# Patient Record
Sex: MALE | Race: WHITE | Employment: FULL TIME | URBAN - METROPOLITAN AREA
[De-identification: names, ages, dates, MRNs, and addresses within clinical notes are randomized per-mention and may not be internally consistent; named-entity substitution may affect disease eponyms.]

---

## 2018-06-22 ENCOUNTER — HOSPITAL ENCOUNTER (OUTPATIENT)
Age: 38
Discharge: HOME | End: 2018-06-22
Payer: COMMERCIAL

## 2018-06-22 LAB
ALBUMIN SERPL-MCNC: 4.3 G/DL (ref 3.5–5.2)
ALBUMIN/GLOBULIN RATIO: ABNORMAL (ref 1–2.5)
ALP BLD-CCNC: 67 U/L (ref 40–129)
ALT SERPL-CCNC: 100 U/L (ref 5–41)
ANION GAP SERPL CALCULATED.3IONS-SCNC: 12 MMOL/L (ref 9–17)
AST SERPL-CCNC: 68 U/L
BILIRUB SERPL-MCNC: 0.39 MG/DL (ref 0.3–1.2)
BUN BLDV-MCNC: 11 MG/DL (ref 6–20)
BUN/CREAT BLD: 13 (ref 9–20)
C-REACTIVE PROTEIN: 5.4 MG/L (ref 0–5)
CALCIUM SERPL-MCNC: 8.6 MG/DL (ref 8.6–10.4)
CHLORIDE BLD-SCNC: 102 MMOL/L (ref 98–107)
CHOLESTEROL, FASTING: 252 MG/DL
CHOLESTEROL/HDL RATIO: 6.6
CO2: 25 MMOL/L (ref 20–31)
CREAT SERPL-MCNC: 0.83 MG/DL (ref 0.7–1.2)
GFR AFRICAN AMERICAN: >60 ML/MIN
GFR NON-AFRICAN AMERICAN: >60 ML/MIN
GFR SERPL CREATININE-BSD FRML MDRD: ABNORMAL ML/MIN/{1.73_M2}
GFR SERPL CREATININE-BSD FRML MDRD: ABNORMAL ML/MIN/{1.73_M2}
GLUCOSE FASTING: 112 MG/DL (ref 70–99)
HCT VFR BLD CALC: 44.8 % (ref 41–53)
HDLC SERPL-MCNC: 38 MG/DL
HEMOGLOBIN: 15.1 G/DL (ref 13.5–17.5)
LDL CHOLESTEROL: 188 MG/DL (ref 0–130)
MCH RBC QN AUTO: 32.3 PG (ref 26–34)
MCHC RBC AUTO-ENTMCNC: 33.6 G/DL (ref 31–37)
MCV RBC AUTO: 96.2 FL (ref 80–100)
NRBC AUTOMATED: NORMAL PER 100 WBC
PDW BLD-RTO: 13.2 % (ref 11.5–14.5)
PLATELET # BLD: 220 K/UL (ref 130–400)
PMV BLD AUTO: NORMAL FL (ref 6–12)
POTASSIUM SERPL-SCNC: 4.3 MMOL/L (ref 3.7–5.3)
RBC # BLD: 4.66 M/UL (ref 4.5–5.9)
SODIUM BLD-SCNC: 139 MMOL/L (ref 135–144)
T3 FREE: 3.33 PG/ML (ref 2.02–4.43)
THYROXINE, FREE: 1.22 NG/DL (ref 0.93–1.7)
TOTAL PROTEIN: 7.5 G/DL (ref 6.4–8.3)
TRIGLYCERIDE, FASTING: 130 MG/DL
TSH SERPL DL<=0.05 MIU/L-ACNC: 1.51 MIU/L (ref 0.3–5)
VLDLC SERPL CALC-MCNC: ABNORMAL MG/DL (ref 1–30)
WBC # BLD: 6.7 K/UL (ref 3.5–11)

## 2018-06-22 PROCEDURE — 84481 FREE ASSAY (FT-3): CPT

## 2018-06-22 PROCEDURE — 85027 COMPLETE CBC AUTOMATED: CPT

## 2018-06-22 PROCEDURE — 86140 C-REACTIVE PROTEIN: CPT

## 2018-06-22 PROCEDURE — 36415 COLL VENOUS BLD VENIPUNCTURE: CPT

## 2018-06-22 PROCEDURE — 80053 COMPREHEN METABOLIC PANEL: CPT

## 2018-06-22 PROCEDURE — 80061 LIPID PANEL: CPT

## 2018-06-22 PROCEDURE — 84439 ASSAY OF FREE THYROXINE: CPT

## 2018-06-22 PROCEDURE — 84443 ASSAY THYROID STIM HORMONE: CPT

## 2021-05-11 ENCOUNTER — HOSPITAL ENCOUNTER (EMERGENCY)
Age: 41
Discharge: HOME | End: 2021-05-11
Attending: EMERGENCY MEDICINE
Payer: COMMERCIAL

## 2021-05-11 VITALS
RESPIRATION RATE: 16 BRPM | HEIGHT: 72 IN | SYSTOLIC BLOOD PRESSURE: 165 MMHG | BODY MASS INDEX: 39.28 KG/M2 | OXYGEN SATURATION: 98 % | TEMPERATURE: 98.3 F | HEART RATE: 71 BPM | DIASTOLIC BLOOD PRESSURE: 89 MMHG | WEIGHT: 290 LBS

## 2021-05-11 DIAGNOSIS — R42 VERTIGO: ICD-10-CM

## 2021-05-11 DIAGNOSIS — R42 DIZZINESS: Primary | ICD-10-CM

## 2021-05-11 LAB
ABSOLUTE EOS #: 0.16 K/UL (ref 0–0.4)
ABSOLUTE IMMATURE GRANULOCYTE: 0 K/UL (ref 0–0.3)
ABSOLUTE LYMPH #: 2.19 K/UL (ref 1–4.8)
ABSOLUTE MONO #: 0.24 K/UL (ref 0.1–0.8)
ALBUMIN SERPL-MCNC: 3.6 G/DL (ref 3.5–5.2)
ALBUMIN/GLOBULIN RATIO: 0.8 (ref 1–2.5)
ALP BLD-CCNC: 72 U/L (ref 40–129)
ALT SERPL-CCNC: 26 U/L (ref 5–41)
ANION GAP SERPL CALCULATED.3IONS-SCNC: 10 MMOL/L (ref 9–17)
AST SERPL-CCNC: 54 U/L
BASOPHILS # BLD: 2 % (ref 0–2)
BASOPHILS ABSOLUTE: 0.16 K/UL (ref 0–0.2)
BILIRUB SERPL-MCNC: 0.71 MG/DL (ref 0.3–1.2)
BILIRUBIN DIRECT: 0.25 MG/DL
BILIRUBIN, INDIRECT: 0.46 MG/DL (ref 0–1)
BUN BLDV-MCNC: 8 MG/DL (ref 6–20)
BUN/CREAT BLD: ABNORMAL (ref 9–20)
CALCIUM SERPL-MCNC: 8.3 MG/DL (ref 8.6–10.4)
CHLORIDE BLD-SCNC: 100 MMOL/L (ref 98–107)
CO2: 25 MMOL/L (ref 20–31)
CREAT SERPL-MCNC: 0.64 MG/DL (ref 0.7–1.2)
DIFFERENTIAL TYPE: ABNORMAL
EOSINOPHILS RELATIVE PERCENT: 2 % (ref 1–4)
GFR AFRICAN AMERICAN: >60 ML/MIN
GFR NON-AFRICAN AMERICAN: >60 ML/MIN
GFR SERPL CREATININE-BSD FRML MDRD: ABNORMAL ML/MIN/{1.73_M2}
GFR SERPL CREATININE-BSD FRML MDRD: ABNORMAL ML/MIN/{1.73_M2}
GLOBULIN: ABNORMAL G/DL (ref 1.5–3.8)
GLUCOSE BLD-MCNC: 112 MG/DL (ref 70–99)
HCT VFR BLD CALC: 33.8 % (ref 40.7–50.3)
HEMOGLOBIN: 9.5 G/DL (ref 13–17)
IMMATURE GRANULOCYTES: 0 %
LYMPHOCYTES # BLD: 27 % (ref 24–44)
MCH RBC QN AUTO: 22.5 PG (ref 25.2–33.5)
MCHC RBC AUTO-ENTMCNC: 28.1 G/DL (ref 28.4–34.8)
MCV RBC AUTO: 79.9 FL (ref 82.6–102.9)
MONOCYTES # BLD: 3 % (ref 1–7)
MORPHOLOGY: ABNORMAL
NRBC AUTOMATED: 0 PER 100 WBC
PDW BLD-RTO: 18.5 % (ref 11.8–14.4)
PLATELET # BLD: 214 K/UL (ref 138–453)
PLATELET ESTIMATE: ABNORMAL
PMV BLD AUTO: 9.8 FL (ref 8.1–13.5)
POTASSIUM SERPL-SCNC: 4.2 MMOL/L (ref 3.7–5.3)
RBC # BLD: 4.23 M/UL (ref 4.21–5.77)
RBC # BLD: ABNORMAL 10*6/UL
SEG NEUTROPHILS: 66 % (ref 36–66)
SEGMENTED NEUTROPHILS ABSOLUTE COUNT: 5.35 K/UL (ref 1.8–7.7)
SODIUM BLD-SCNC: 135 MMOL/L (ref 135–144)
TOTAL PROTEIN: 8 G/DL (ref 6.4–8.3)
TROPONIN INTERP: NORMAL
TROPONIN INTERP: NORMAL
TROPONIN T: NORMAL NG/ML
TROPONIN T: NORMAL NG/ML
TROPONIN, HIGH SENSITIVITY: <6 NG/L (ref 0–22)
TROPONIN, HIGH SENSITIVITY: <6 NG/L (ref 0–22)
WBC # BLD: 8.1 K/UL (ref 3.5–11.3)
WBC # BLD: ABNORMAL 10*3/UL

## 2021-05-11 PROCEDURE — 6370000000 HC RX 637 (ALT 250 FOR IP): Performed by: STUDENT IN AN ORGANIZED HEALTH CARE EDUCATION/TRAINING PROGRAM

## 2021-05-11 PROCEDURE — 84484 ASSAY OF TROPONIN QUANT: CPT

## 2021-05-11 PROCEDURE — 2580000003 HC RX 258: Performed by: STUDENT IN AN ORGANIZED HEALTH CARE EDUCATION/TRAINING PROGRAM

## 2021-05-11 PROCEDURE — 99284 EMERGENCY DEPT VISIT MOD MDM: CPT

## 2021-05-11 PROCEDURE — 93005 ELECTROCARDIOGRAM TRACING: CPT | Performed by: STUDENT IN AN ORGANIZED HEALTH CARE EDUCATION/TRAINING PROGRAM

## 2021-05-11 PROCEDURE — 80048 BASIC METABOLIC PNL TOTAL CA: CPT

## 2021-05-11 PROCEDURE — 85025 COMPLETE CBC W/AUTO DIFF WBC: CPT

## 2021-05-11 PROCEDURE — 80076 HEPATIC FUNCTION PANEL: CPT

## 2021-05-11 RX ORDER — MECLIZINE HCL 12.5 MG/1
12.5 TABLET ORAL ONCE
Status: COMPLETED | OUTPATIENT
Start: 2021-05-11 | End: 2021-05-11

## 2021-05-11 RX ORDER — 0.9 % SODIUM CHLORIDE 0.9 %
1000 INTRAVENOUS SOLUTION INTRAVENOUS ONCE
Status: COMPLETED | OUTPATIENT
Start: 2021-05-11 | End: 2021-05-11

## 2021-05-11 RX ORDER — MECLIZINE HCL 12.5 MG/1
12.5 TABLET ORAL 3 TIMES DAILY PRN
Qty: 15 TABLET | Refills: 0 | Status: SHIPPED | OUTPATIENT
Start: 2021-05-11 | End: 2021-05-21

## 2021-05-11 RX ADMIN — SODIUM CHLORIDE 1000 ML: 9 INJECTION, SOLUTION INTRAVENOUS at 13:50

## 2021-05-11 RX ADMIN — MECLIZINE HYDROCHLORIDE 12.5 MG: 12.5 TABLET, FILM COATED ORAL at 13:49

## 2021-05-11 ASSESSMENT — ENCOUNTER SYMPTOMS
NAUSEA: 1
COUGH: 0
ABDOMINAL PAIN: 0
DIARRHEA: 0
BACK PAIN: 0
CONSTIPATION: 0
VOMITING: 0
SHORTNESS OF BREATH: 0

## 2021-05-11 NOTE — ED TRIAGE NOTES
Pt presents to ED with co dizziness and headache that started this morning 5/11/21. Pt denies taking any medications, pt states drinks about a 6 pack every other day. Pt denies cigarette use or marijuana use. Denies other drug use besides alcohol. NAD resp even and non labored. Speech clear and apropriate. Pt ambulatory with steady gait. Side rails up x 2 call light within reach.

## 2021-05-11 NOTE — ED PROVIDER NOTES
[address] [facility]     Emergency Department     Faculty Attestation    I performed a history and physical examination of the patient and discussed management with the resident. I reviewed the residents note and agree with the documented findings and plan of care. Any areas of disagreement are noted on the chart. I was personally present for the key portions of any procedures. I have documented in the chart those procedures where I was not present during the key portions. I have reviewed the emergency nurses triage note. I agree with the chief complaint, past medical history, past surgical history, allergies, medications, social, and family history as documented unless otherwise noted below.          This is a 66-year-old male who presents to the emergency department for evaluation of several concerns  Patient has had some mild dizziness  Patient is vague about this dizziness as to whether it is more consistent with near syncope or vertigo  No headache  No ringing in the ears  No loss of hearing  No vision changes  No slurred speech or drooping of the face  No unilateral or focal sensory/motor deficits  Dizziness is worse with certain movements and is better with rest  Patient does have a history of vertigo  No chest pain or palpitations  No shortness of breath, cough, or wheezing  No GI/ complaints  Patient does report a history of high blood pressure but is not on antihypertensive medication  Patient notes earlier that his blood pressure was in the 180/110 range  Review of systems otherwise negative    On examination he appears in no acute distress  Head is normocephalic and atraumatic  TMs are clear  Normal hearing  Cranial nerves intact  Pupils equal, round, and reactive to light  No facial asymmetry  Normal muscle strength and sensation  Normal coordination  Heart is regular in rate and rhythm  Lungs are clear to auscultation  Abdomen soft and nontender  Normal peripheral perfusion ad skin turgor    CT head not clinically indicated    EKG at 1336 shows a sinus arrhythmia with a rate of 72 bpm.  Intervals within normal limits. Normal axis. Poor wave progression. No T wave inversion. No ST segment depression/elevation or evidence for acute ischemia/infarction.     Labs reviewed  Mild anemia  Patient refusing rectal    BP improving  Neuro intact  Stable for outpatient follow up          Beatriz Carter DO  Attending Emergency Physician        Remberto Puckett DO  05/11/21 8324

## 2021-05-11 NOTE — ED PROVIDER NOTES
[facility]  ED  Emergency Department Encounter  Emergency Medicine Resident     Pt Name: Kathy Bowen  MRN: [id number]  Armsherbertgfrobert 1980  Date of evaluation: 5/11/21  PCP:  No primary care provider on file. CHIEF COMPLAINT       Chief Complaint   Patient presents with    Hypertension     pt repoting hx of HTN but does not take any medication d/t no pcp    Dizziness       HISTORY OFPRESENT ILLNESS  (Location/Symptom, Timing/Onset, Context/Setting, Quality, Duration, Modifying Factors,Severity.)      Kathy Bowen is a 39 y.o. male who presents with complaints of hypertension and dizziness. Patient with possible history of hypertension but states he does not follow with PCP is not on any medications. He states that he checks his blood pressure at work and is consistently in the 180s including today. 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 He does have difficulty describing how he felt and at some time states that he felt lightheaded. He does have a history of vertigo and states this somewhat feels similar but somewhat does not. He currently denies any symptoms at this time except he just does not \"feel right\". He denies any headache, vision changes, trauma, neck pain, fever, chest pain, shortness of breath. He denies any history of DVT/PE, recent surgery, mobilization, hormone placement, hemoptysis, active malignancy. PAST MEDICAL / SURGICAL / SOCIAL / FAMILY HISTORY      has a past medical history of Hypertension. has no past surgical history on file.      Social History     Socioeconomic History    Marital status:      Spouse name: Not on file    Number of children: Not on file    Years of education: Not on file    Highest education level: Not on file   Occupational History    Not on file   Social Needs    Financial resource strain: Not on file    Food insecurity     Worry: Not on file Inability: Not on file    Transportation needs     Medical: Not on file     Non-medical: Not on file   Tobacco Use    Smoking status: Never Smoker    Smokeless tobacco: Never Used   Substance and Sexual Activity    Alcohol use: Not on file    Drug use: Not on file    Sexual activity: Not on file   Lifestyle    Physical activity     Days per week: Not on file     Minutes per session: Not on file    Stress: Not on file   Relationships    Social connections     Talks on phone: Not on file     Gets together: Not on file     Attends Protestant service: Not on file     Active member of club or organization: Not on file     Attends meetings of clubs or organizations: Not on file     Relationship status: Not on file    Intimate partner violence     Fear of current or ex partner: Not on file     Emotionally abused: Not on file     Physically abused: Not on file     Forced sexual activity: Not on file   Other Topics Concern    Not on file   Social History Narrative    Not on file       History reviewed. No pertinent family history. Allergies:  Patient has no known allergies. Home Medications:  Prior to Admission medications    Medication Sig Start Date End Date Taking? Authorizing Provider   meclizine (ANTIVERT) 12.5 MG tablet Take 1 tablet by mouth 3 times daily as needed for Dizziness 5/11/21 5/21/21 Yes Rodrick Vieyra, DO       REVIEW OFSYSTEMS    (2-9 systems for level 4, 10 or more for level 5)      Review of Systems   Constitutional: Negative for activity change, appetite change, chills, fatigue and fever. Eyes: Negative for visual disturbance. Respiratory: Negative for cough and shortness of breath. Cardiovascular: Negative for chest pain and leg swelling. Gastrointestinal: Positive for nausea. Negative for abdominal pain, constipation, diarrhea and vomiting. Musculoskeletal: Negative for back pain and neck pain. Skin: Negative for rash and wound.    Neurological: Positive for dizziness and light-headedness. Negative for syncope, facial asymmetry, speech difficulty, weakness, numbness and headaches. Psychiatric/Behavioral: Negative for confusion. PHYSICAL EXAM   (up to 7 for level 4, 8 or more forlevel 5)      INITIAL VITALS:   ED Triage Vitals   BP Temp Temp src Pulse Resp SpO2 Height Weight   -- -- -- -- -- -- -- --       Physical Exam  Vitals signs and nursing note reviewed. Constitutional:       General: He is not in acute distress. Appearance: Normal appearance. He is well-developed. He is not diaphoretic. HENT:      Head: Normocephalic and atraumatic. Nose: Nose normal.   Eyes:      Extraocular Movements: Extraocular movements intact. Conjunctiva/sclera: Conjunctivae normal.      Pupils: Pupils are equal, round, and reactive to light. Neck:      Musculoskeletal: Normal range of motion and neck supple. Vascular: No carotid bruit. Cardiovascular:      Rate and Rhythm: Normal rate and regular rhythm. Heart sounds: Normal heart sounds. Pulmonary:      Effort: Pulmonary effort is normal. No respiratory distress. Breath sounds: Normal breath sounds. No wheezing or rales. Abdominal:      General: There is no distension. Palpations: Abdomen is soft. Tenderness: There is no abdominal tenderness. There is no guarding. Musculoskeletal: Normal range of motion. General: No swelling or tenderness. Skin:     General: Skin is warm and dry. Neurological:      Mental Status: He is alert and oriented to person, place, and time. Mental status is at baseline. Sensory: No sensory deficit. Motor: No weakness. Coordination: Coordination normal.      Gait: Gait normal.      Comments: No aphasia, no dysarthria, no facial droop   Psychiatric:         Behavior: Behavior normal.         Thought Content:  Thought content normal.         DIFFERENTIAL  DIAGNOSIS     PLAN (LABS / IMAGING / EKG):  Orders Placed This Encounter   Procedures    CBC WITH AUTO DIFFERENTIAL    BASIC METABOLIC PANEL    Troponin    Hepatic Function Panel    Troponin    EKG 12 Lead       MEDICATIONS ORDERED:  Orders Placed This Encounter   Medications    0.9 % sodium chloride bolus    meclizine (ANTIVERT) tablet 12.5 mg    meclizine (ANTIVERT) 12.5 MG tablet     Sig: Take 1 tablet by mouth 3 times daily as needed for Dizziness     Dispense:  15 tablet     Refill:  0         Initial MDM/Plan/ED COURSE:    39 y.o. male who presents with complaints of hypertension, dizziness/lightheadedness. On exam patient is well-appearing, nontoxic. Vital signs are within normal limits. On physical exam abdomen is soft, nontender, nondistended. Lungs clear to auscultation bilaterally. Cardiac regular rate and rhythm. No abnormal coordination with finger-to-nose. 5-5 strength in bilateral upper and lower extremities, sensation intact in bilateral lower extremities. No facial droop, no dysarthria, no aphasia. No abnormal coordination with finger-to-nose. Pupils are equal, round, reactive to light with extraocular motions intact. No nystagmus noted on exam.  No calf swelling or tenderness. Neck supple with full range of motion. No carotid bruit. Plan for cardiac labs, EKG. Will give symptom control with IV fluids and Antivert. Given that patient is completely neurologically intact on exam, will hold off on CT head and CTA head and neck. If on reevaluation patient does not feel any better will offer CT head and CTA, however at this time given his normal exam, no current symptoms will not get at this time. If work-up unremarkable, patient feeling better, patient will need discharge home with follow-up with PCP for blood pressure control. ED Course as of May 12 1424   Tue May 11, 2021   1452 Evaluated patient. Ambulated to the bathroom with a steady gait and no reproduction of symptoms. Updated on lab results. Of note patient's hemoglobin 9.5, 2 years ago was 15. Patient states that occasionally he does have some bloody stools, states that is not the stool itself but when he wipes there is blood on the toilet paper, notes hemorrhoids. He states he has not had a colonoscopy yet. No gross blood at this time. [LW]   0460 Plan for repeat troponin. [LW]      ED Course User Index  [LW] Vinayak Moran, DO      Troponin stable. Did reevaluate the patient, again he is ambulating without difficulty no current symptoms at this time. Did offer patient CT head, however did have discussion that given his normal exam findings, negative work-up here no current symptoms is unlikely it would find anything. After some shared decision-making, patient, wife decided that they hold off on CT head, and plan for discharge home with symptom control with meclizine and follow-up with PCP. Provided patient with clinic list as well as the [facility] clinic number. Instructed on the importance of following up given his blood pressure, and intermittent rectal bleeding. He was instructed to return to emergency department for any new or concerning symptoms such as similar symptoms not relieved by meclizine, worsening rectal bleeding, lightheadedness, dizziness, chest pain, shortness of breath or any other new or concerning symptoms. Patient differential at this time, all questions answered. Patient discharged home in stable condition.     DIAGNOSTIC RESULTS / EMERGENCYDEPARTMENT COURSE / MDM     LABS:  Labs Reviewed   CBC WITH AUTO DIFFERENTIAL - Abnormal; Notable for the following components:       Result Value    Hemoglobin 9.5 (*)     Hematocrit 33.8 (*)     MCV 79.9 (*)     MCH 22.5 (*)     MCHC 28.1 (*)     RDW 18.5 (*)     All other components within normal limits   BASIC METABOLIC PANEL - Abnormal; Notable for the following components:    Glucose 112 (*)     CREATININE 0.64 (*)     Calcium 8.3 (*)     All other components within normal limits   HEPATIC FUNCTION PANEL - Abnormal; Notable for the following components:    AST 54 (*)     Albumin/Globulin Ratio 0.8 (*)     All other components within normal limits   TROPONIN   TROPONIN           No results found. PROCEDURES:  None    CONSULTS:  None    CRITICAL CARE:  Please see attending note    FINAL IMPRESSION      1. Dizziness    2.  Vertigo          DISPOSITION / PLAN     DISPOSITION Decision To Discharge 05/11/2021 03:46:57 PM      PATIENT REFERRED TO:  [facility] ED  [address] [facility]  [phone]  Go to   If symptoms worsen    [address] [place] [address] [id number]  Call in 3 days        DISCHARGE MEDICATIONS:  Discharge Medication List as of 5/11/2021  3:49 PM      START taking these medications    Details   meclizine (ANTIVERT) 12.5 MG tablet Take 1 tablet by mouth 3 times daily as needed for Dizziness, Disp-15 tablet, R-0Print             Laurie Escalante DO  Emergency Medicine Resident    (Please note that portions of this note were completed with a voice recognition program.Efforts were made to edit the dictations but occasionally words are mis-transcribed.)        Laurie Escalante DO  Resident  05/12/21 Deidre Stephens 79,   Resident  05/12/21 1424

## 2021-05-11 NOTE — ED NOTES
Bed: 39  Expected date:   Expected time:   Means of arrival:   Comments:     Marti Lindsey RN  05/11/21 5241

## 2021-05-12 LAB
EKG ATRIAL RATE: 72 BPM
EKG P AXIS: 65 DEGREES
EKG P-R INTERVAL: 156 MS
EKG Q-T INTERVAL: 388 MS
EKG QRS DURATION: 98 MS
EKG QTC CALCULATION (BAZETT): 424 MS
EKG R AXIS: 64 DEGREES
EKG T AXIS: 52 DEGREES
EKG VENTRICULAR RATE: 72 BPM

## 2021-05-12 PROCEDURE — 93010 ELECTROCARDIOGRAM REPORT: CPT | Performed by: INTERNAL MEDICINE

## 2021-05-17 ENCOUNTER — HOSPITAL ENCOUNTER (OUTPATIENT)
Dept: SURGERY | Age: 41
Discharge: HOME | End: 2021-05-17
Payer: COMMERCIAL

## 2021-05-17 VITALS
HEART RATE: 89 BPM | DIASTOLIC BLOOD PRESSURE: 76 MMHG | BODY MASS INDEX: 36.06 KG/M2 | WEIGHT: 290 LBS | SYSTOLIC BLOOD PRESSURE: 116 MMHG | OXYGEN SATURATION: 97 % | HEIGHT: 75 IN

## 2021-05-17 PROCEDURE — 99203 OFFICE O/P NEW LOW 30 MIN: CPT | Performed by: SURGERY

## 2021-05-17 PROCEDURE — 99202 OFFICE O/P NEW SF 15 MIN: CPT

## 2021-05-17 NOTE — CONSULTS
[facility]  Consult Note            NAME:  Laura Monson  MRN: [id number]   YOB: 1980   Date: 5/17/2021   Age: 39 y.o. Gender: male     Body mass index is 36.25 kg/m². Chief Complaint: Abdominal hernia    History of Present Illness: This is a very pleasant 80-year-old gentleman who presents to the office today with abdominal wall bulging with discomfort. The patient states that he has had this hernia for at least 3 years. It began fairly small but has been gradually increasing in size. The patient states that he recently has begun to notice significant swelling which is visible through his shirt. The patient states that he remains fairly active. He states that with activity he has noticed a dull and aching sensation. The patient denies any nausea or vomiting. He denies any major bowel habit changes or blood in his stools. He has not had any previous abdominal surgeries. The patient denies any issues with excessive bleeding or bruising. He denies any history of recurrent soft tissue infections or MRSA. The patient states that he has not had any recent abdominal imaging. He denies any history of peptic ulcer disease, pancreatitis, or hepatitis. He denies any history of jaundice, light-colored stools, or tea colored urine. The patient denies any problems with family members and anesthesia. He has not required anything in terms of medication for management of discomfort. The patient is able to reduce this hernia without difficulty. Given his abdominal wall discomfort with bulging consistent with a ventral hernia, a surgical evaluation was requested for recommendations and management.     Current Outpatient Medications on File Prior to Encounter   Medication Sig Dispense Refill    meclizine (ANTIVERT) 12.5 MG tablet Take 1 tablet by mouth 3 times daily as needed for Dizziness 15 tablet 0     No current facility-administered medications on file prior to encounter. No Known Allergies    Past Medical History:   Diagnosis Date    Hypertension    Vertigo     History reviewed. No pertinent family history. Social History     Socioeconomic History    Marital status:      Spouse name: None    Number of children: None    Years of education: None    Highest education level: None   Occupational History    None   Tobacco Use    Smoking status: Never Smoker    Smokeless tobacco: Never Used   Substance and Sexual Activity    Alcohol use: Yes     Comment: Socially    Drug use: Never    Sexual activity: Yes     Partners: Female   Other Topics Concern    None   Social History Narrative    None     Social Determinants of Health     Financial Resource Strain:     Difficulty of Paying Living Expenses:    Food Insecurity:     Worried About Running Out of Food in the Last Year:     Ran Out of Food in the Last Year:    Transportation Needs:     Lack of Transportation (Medical):      Lack of Transportation (Non-Medical):    Physical Activity:     Days of Exercise per Week:     Minutes of Exercise per Session:    Stress:     Feeling of Stress :    Social Connections:     Frequency of Communication with Friends and Family:     Frequency of Social Gatherings with Friends and Family:     Attends Worship Services:     Active Member of Clubs or Organizations:     Attends Club or Organization Meetings:     Marital Status:    Intimate Partner Violence:     Fear of Current or Ex-Partner:     Emotionally Abused:     Physically Abused:     Sexually Abused:        Review of Systems - History obtained from chart review and the patient  General ROS: negative for - chills, fever or malaise  Psychological ROS: negative for - anxiety, depression or disorientation  Ophthalmic ROS: negative for - blurry vision, eye pain or itchy eyes  ENT ROS: negative for - epistaxis, oral lesions or sinus pain  Allergy and Immunology ROS: negative for - hives, itchy/watery eyes or latex  Hematological and Lymphatic ROS: negative for - bleeding problems, blood clots or blood transfusions  Endocrine ROS: negative for - malaise/lethargy, palpitations or polydipsia/polyuria  Breast ROS: negative for breast lumps  Respiratory ROS: no cough, shortness of breath, or wheezing  Cardiovascular ROS: no chest pain or dyspnea on exertion  Gastrointestinal ROS: positive for - abdominal pain  Genito-Urinary ROS: no dysuria, trouble voiding, or hematuria  Musculoskeletal ROS: negative for - gait disturbance, joint pain, joint stiffness or joint swelling  Neurological ROS: no TIA or stroke symptoms  Dermatological ROS: negative for - pruritus, rash or skin lesion changes    Vitals:    05/17/21 1531   BP: 116/76   Pulse:    SpO2:         No intake/output data recorded.     General Appearance: alert and oriented to person, place and time, well-developed and well-nourished, in no acute distress  Skin: warm and dry, no rash or erythema  Head: normocephalic and atraumatic  Eyes: pupils equal, round, and reactive to light, extraocular eye movements intact, conjunctivae normal  ENT: hearing grossly normal bilaterally  Neck: neck supple and non tender without mass, no thyromegaly or thyroid nodules, no cervical lymphadenopathy   Pulmonary/Chest: clear to auscultation bilaterally- no wheezes, rales or rhonchi, normal air movement, no respiratory distress  Cardiovascular: normal rate, normal S1 and S2, no gallops, intact distal pulses and no carotid bruits  Abdomen: Soft, reducible ventral hernia without evidence of incarceration or strangulation, no peritoneal signs, no organomegaly, bowel sounds active  Breast: breasts appear normal, no suspicious masses, no skin or nipple changes or axillary nodes  Genitourinary: genitals normal without hernia or inguinal adenopathy  Extremities: no cyanosis, no clubbing and Nancy's sign negative bilaterally  Musculoskeletal: normal range of motion, no joint swelling, reviewing records, performing a history and physical examination, discussing risks and benefits of the procedure and developing an assessment and plan.     Electronically signed by Carlos Moss MD on 5/17/2021 at 4:22 PM

## 2021-06-01 ENCOUNTER — OFFICE VISIT (OUTPATIENT)
Dept: INTERNAL MEDICINE CLINIC | Age: 41
End: 2021-06-01
Payer: COMMERCIAL

## 2021-06-01 VITALS
OXYGEN SATURATION: 97 % | DIASTOLIC BLOOD PRESSURE: 82 MMHG | BODY MASS INDEX: 37.05 KG/M2 | HEIGHT: 75 IN | HEART RATE: 90 BPM | SYSTOLIC BLOOD PRESSURE: 130 MMHG | WEIGHT: 298 LBS

## 2021-06-01 DIAGNOSIS — D50.9 MICROCYTIC ANEMIA: Primary | ICD-10-CM

## 2021-06-01 DIAGNOSIS — Z13.220 SCREENING FOR HYPERLIPIDEMIA: ICD-10-CM

## 2021-06-01 DIAGNOSIS — Z13.1 ENCOUNTER FOR SCREENING FOR DIABETES MELLITUS: ICD-10-CM

## 2021-06-01 DIAGNOSIS — E66.01 CLASS 2 SEVERE OBESITY DUE TO EXCESS CALORIES WITH SERIOUS COMORBIDITY AND BODY MASS INDEX (BMI) OF 37.0 TO 37.9 IN ADULT (HCC): ICD-10-CM

## 2021-06-01 PROBLEM — E66.09 CLASS 2 OBESITY DUE TO EXCESS CALORIES IN ADULT: Status: ACTIVE | Noted: 2021-06-01

## 2021-06-01 PROCEDURE — G8419 CALC BMI OUT NRM PARAM NOF/U: HCPCS | Performed by: INTERNAL MEDICINE

## 2021-06-01 PROCEDURE — 1036F TOBACCO NON-USER: CPT | Performed by: INTERNAL MEDICINE

## 2021-06-01 PROCEDURE — G8427 DOCREV CUR MEDS BY ELIG CLIN: HCPCS | Performed by: INTERNAL MEDICINE

## 2021-06-01 PROCEDURE — 99204 OFFICE O/P NEW MOD 45 MIN: CPT | Performed by: INTERNAL MEDICINE

## 2021-06-01 RX ORDER — MECLIZINE HYDROCHLORIDE 25 MG/1
25 TABLET ORAL 3 TIMES DAILY PRN
Qty: 30 TABLET | Refills: 0 | Status: SHIPPED | OUTPATIENT
Start: 2021-06-01 | End: 2021-07-01

## 2021-06-01 ASSESSMENT — PATIENT HEALTH QUESTIONNAIRE - PHQ9
SUM OF ALL RESPONSES TO PHQ9 QUESTIONS 1 & 2: 0
1. LITTLE INTEREST OR PLEASURE IN DOING THINGS: 0
SUM OF ALL RESPONSES TO PHQ QUESTIONS 1-9: 0
2. FEELING DOWN, DEPRESSED OR HOPELESS: 0
SUM OF ALL RESPONSES TO PHQ QUESTIONS 1-9: 0
SUM OF ALL RESPONSES TO PHQ QUESTIONS 1-9: 0

## 2021-06-01 ASSESSMENT — ENCOUNTER SYMPTOMS
BLOOD IN STOOL: 0
ABDOMINAL DISTENTION: 0
COUGH: 0
TROUBLE SWALLOWING: 0
EYE DISCHARGE: 0
COLOR CHANGE: 0
DIARRHEA: 0
EYE PAIN: 0
SHORTNESS OF BREATH: 0
WHEEZING: 0

## 2021-06-01 NOTE — PROGRESS NOTES
[address] [address]  Dept: [phone]  Dept Fax: [phone]    Sara Joya is a 39 y.o. male who presents today for his medical conditions/complaintsas noted below. Sara Joya is c/o of   Chief Complaint   Patient presents with    New Patient    Hernia     surgery on June 6th         HPI:     New pt  abdo hernia large        No results found for: LABA1C          ( goal A1Cis < 7)   No results found for: LABMICR  LDL Cholesterol (mg/dL)   Date Value   06/22/2018 188 (H)       (goal LDL is <100)   AST (U/L)   Date Value   05/11/2021 54 (H)     ALT (U/L)   Date Value   05/11/2021 26     BUN (mg/dL)   Date Value   05/11/2021 8     BP Readings from Last 3 Encounters:   06/01/21 130/82   05/17/21 116/76   05/11/21 (!) 165/89          (goal 120/80)    Past Medical History:   Diagnosis Date    Hypertension       No past surgical history on file. No family history on file. Social History     Tobacco Use    Smoking status: Never Smoker    Smokeless tobacco: Never Used   Substance Use Topics    Alcohol use: Yes     Comment: Socially      Current Outpatient Medications   Medication Sig Dispense Refill    meclizine (ANTIVERT) 25 MG tablet Take 1 tablet by mouth 3 times daily as needed for Dizziness 30 tablet 0    atorvastatin (LIPITOR) 10 MG tablet Take 10 mg by mouth daily (Patient not taking: Reported on 6/1/2021)       No current facility-administered medications for this visit.      No Known Allergies    Health Maintenance   Topic Date Due    Hepatitis C screen  Never done    Varicella vaccine (1 of 2 - 2-dose childhood series) Never done    COVID-19 Vaccine (1) Never done    HIV screen  Never done    Lipid screen  06/22/2019    Diabetes screen  04/14/2020    Flu vaccine (Season Ended) 09/01/2021    DTaP/Tdap/Td vaccine (2 - Td) 05/23/2023    Hepatitis A vaccine  Aged Out    Hepatitis B vaccine  Aged Out    Hib vaccine  Aged Out  Meningococcal (ACWY) vaccine  Aged Out    Pneumococcal 0-64 years Vaccine  Aged Out       Subjective:     Review of Systems   Constitutional: Negative for appetite change, diaphoresis and fatigue. HENT: Negative for ear discharge and trouble swallowing. Eyes: Negative for pain and discharge. Respiratory: Negative for cough, shortness of breath and wheezing. Cardiovascular: Negative for chest pain and palpitations. Gastrointestinal: Negative for abdominal distention, blood in stool and diarrhea. Large abdo hernia     Endocrine: Negative for polydipsia and polyphagia. Genitourinary: Negative for difficulty urinating and frequency. Musculoskeletal: Negative for gait problem, myalgias and neck pain. Skin: Negative for color change and rash. Allergic/Immunologic: Negative for environmental allergies and food allergies. Neurological: Negative for dizziness and headaches. Hematological: Negative for adenopathy. Does not bruise/bleed easily. Psychiatric/Behavioral: Negative for behavioral problems and sleep disturbance. Objective:     Physical Exam  Constitutional:       Appearance: He is well-developed. He is not diaphoretic. HENT:      Head: Normocephalic and atraumatic. Eyes:      General:         Right eye: No discharge. Left eye: No discharge. Extraocular Movements:      Right eye: Normal extraocular motion. Left eye: Normal extraocular motion. Conjunctiva/sclera: Conjunctivae normal.      Right eye: Right conjunctiva is not injected. Left eye: Left conjunctiva is not injected. Neck:      Thyroid: No thyroid mass or thyromegaly. Vascular: No JVD. Cardiovascular:      Rate and Rhythm: Normal rate and regular rhythm. Heart sounds: No murmur heard. No friction rub. Pulmonary:      Effort: Pulmonary effort is normal. No tachypnea, bradypnea, accessory muscle usage or respiratory distress. Breath sounds: Normal breath sounds. No wheezing or rales. Abdominal:      General: Bowel sounds are normal. There is no distension. Palpations: Abdomen is soft. Tenderness: There is no abdominal tenderness. There is no rebound. Comments: Large abdo hernia  Non obstructed     Musculoskeletal:         General: No tenderness. Normal range of motion. Cervical back: Normal range of motion and neck supple. No edema or erythema. Lymphadenopathy:      Head:      Right side of head: No submental or submandibular adenopathy. Left side of head: No submental or submandibular adenopathy. Cervical: No cervical adenopathy. Skin:     General: Skin is warm. Coloration: Skin is not pale. Findings: No bruising, ecchymosis or rash. Neurological:      Mental Status: He is alert and oriented to person, place, and time. Cranial Nerves: No cranial nerve deficit. Sensory: No sensory deficit. Motor: No atrophy or abnormal muscle tone. Coordination: Coordination normal.   Psychiatric:         Mood and Affect: Mood is not anxious. Affect is not angry. Speech: Speech is not slurred. Behavior: Behavior normal. Behavior is not aggressive. Thought Content: Thought content does not include homicidal ideation. Cognition and Memory: Memory is not impaired. /82   Pulse 90   Ht 6' 3\" (1.905 m)   Wt 298 lb (135.2 kg)   SpO2 97%   BMI 37.25 kg/m²     Assessment:       Diagnosis Orders   1. Microcytic anemia  Iron And TIBC    Vitamin B12 & Folate    CT ABDOMEN PELVIS W IV CONTRAST Additional Contrast? Oral   2. Screening for hyperlipidemia  Lipid, Fasting   3. Encounter for screening for diabetes mellitus  Glucose, Fasting   4. Class 2 severe obesity due to excess calories with serious comorbidity and body mass index (BMI) of 37.0 to 37.9 in adult [facility])               Plan:      Return in about 1 month (around 7/1/2021).     Orders Placed This Encounter   Procedures    CT ABDOMEN PELVIS W IV CONTRAST Additional Contrast? Oral     Standing Status:   Future     Standing Expiration Date:   6/1/2022     Order Specific Question:   Additional Contrast?     Answer:   Oral     Order Specific Question:   Reason for exam:     Answer:   abdo mass iwht anemia    Glucose, Fasting     Standing Status:   Future     Standing Expiration Date:   6/1/2022    Lipid, Fasting     Standing Status:   Future     Standing Expiration Date:   6/1/2022    Iron And TIBC     Standing Status:   Future     Standing Expiration Date:   6/1/2022     Order Specific Question:   Is Patient Fasting? Answer:   15     Order Specific Question:   No of Hours? Answer:   12 hour    Vitamin B12 & Folate     Standing Status:   Future     Standing Expiration Date:   6/1/2022     Orders Placed This Encounter   Medications    meclizine (ANTIVERT) 25 MG tablet     Sig: Take 1 tablet by mouth 3 times daily as needed for Dizziness     Dispense:  30 tablet     Refill:  0    abdo hernia  Non urgent surg wthi dr charles  Scheduled  Anemia noted  Some boood pererctum  Will ref to gi for colon     Patient given educational materials - see patient instructions. Discussed use, benefit, and side effects of prescribed medications. All patientquestions answered. Pt voiced understanding. Reviewed health maintenance. Instructedto continue current medications, diet and exercise. Patient agreed with treatmentplan. Follow up as directed.      Electronically signed by Luis Ho MD on 6/1/2021 at 3:09 PM

## 2021-06-08 ENCOUNTER — HOSPITAL ENCOUNTER (OUTPATIENT)
Dept: PREADMISSION TESTING | Age: 41
Discharge: HOME | End: 2021-06-12
Payer: COMMERCIAL

## 2021-06-08 VITALS
HEART RATE: 93 BPM | OXYGEN SATURATION: 98 % | BODY MASS INDEX: 37.18 KG/M2 | HEIGHT: 75 IN | WEIGHT: 299 LBS | SYSTOLIC BLOOD PRESSURE: 158 MMHG | TEMPERATURE: 98.3 F | DIASTOLIC BLOOD PRESSURE: 85 MMHG | RESPIRATION RATE: 20 BRPM

## 2021-06-08 NOTE — PRE-PROCEDURE INSTRUCTIONS
[address] Tuesday arrive at 0830    Once you enter the hospital lobby, take the elevators to the second floor. Check-In is at the surgery registration desk. Continue to take your home medications as you normally do up to and including the night before surgery with the exception of any blood thinning medications. Please stop any blood thinning medications as directed by your surgeon or prescribing physician. Failure to stop certain medications may interfere with your scheduled surgery. These may include:  Aspirin, Warfarin (Coumadin), Clopidogrel (Plavix), Ibuprofen (Motrin, Advil), Naproxen (Aleve), Meloxicam (Mobic), Celecoxib (Celebrex), Eliquis, Pradaxa, Xarelto, Effient, Fish Oil, Herbal supplements. Please take the following medication(s) the day of surgery with a small sip of water:  none    PREPARING FOR YOUR SURGERY:     Before surgery, you can play an important role in your own health. Because skin is not sterile, we need to be sure that your skin is as free of germs as possible before surgery by carefully washing before surgery. Preparing or prepping skin before surgery can reduce the risk of a surgical site infection.   Do not shave the area of your body where your surgery will be performed unless you received specific permission from your physician. You will need to shower at home the night before surgery and the morning of surgery with a special soap called chlorhexidine gluconate (CHG*). *Not to be used by people allergic to Chlorhexidine Gluconate (CHG). Following these instructions will help you be sure that your skin is clean before surgery. Instructions on cleaning your skin before surgery: The night before your surgery:      You will need to shower with warm water (not hot) and the CHG soap.  Use a clean wash cloth and a clean towel. Have clean clothes available to put on after the shower.         First wash your hair with regular  In case of illness - If you have cold or flu like symptoms (high fever, runny nose, sore throat, cough, etc.) rash, nausea, vomiting, loose stools, and/or recent contact with someone who has a contagious disease (chicken pox, measles, etc.) Please call your doctor before coming to the hospital.     Day of Surgery/Procedure:    As a patient at [place] [address] you can expect quality medical and nursing care that is centered on your individual needs. Our goal is to make your surgical experience as comfortable as possible    . Transportation After Your Surgery/Procedure: You will need a friend or family member to drive you home after your procedure. Your  must be 25years of age or older and able to sign off on your discharge instructions. A taxi cab or any other form of public transportation is not acceptable. Your friend or family member must stay at the hospital throughout your procedure. Someone must remain with you for the first 24 hours after your surgery if you receive anesthesia or medication. If you do not have someone to stay with you, your procedure may be cancelled.       If you have any other questions regarding your procedure or the day of surgery, please call [phone]      _________________________  ____________________________  Signature (Patient)              Signature (Provider) & date

## 2021-06-17 ENCOUNTER — HOSPITAL ENCOUNTER (OUTPATIENT)
Dept: CT IMAGING | Age: 41
Discharge: HOME | End: 2021-06-19
Payer: COMMERCIAL

## 2021-06-17 DIAGNOSIS — D50.9 MICROCYTIC ANEMIA: ICD-10-CM

## 2021-06-17 PROCEDURE — 6360000004 HC RX CONTRAST MEDICATION: Performed by: INTERNAL MEDICINE

## 2021-06-17 PROCEDURE — 74177 CT ABD & PELVIS W/CONTRAST: CPT

## 2021-06-17 PROCEDURE — 2580000003 HC RX 258: Performed by: INTERNAL MEDICINE

## 2021-06-17 RX ORDER — SODIUM CHLORIDE 0.9 % (FLUSH) 0.9 %
10 SYRINGE (ML) INJECTION PRN
Status: DISCONTINUED | OUTPATIENT
Start: 2021-06-17 | End: 2021-06-20 | Stop reason: HOSPADM

## 2021-06-17 RX ADMIN — IOPAMIDOL 100 ML: 755 INJECTION, SOLUTION INTRAVENOUS at 11:32

## 2021-06-17 RX ADMIN — IOHEXOL 30 ML: 300 INJECTION, SOLUTION INTRAVENOUS at 11:33

## 2021-06-17 RX ADMIN — SODIUM CHLORIDE, PRESERVATIVE FREE 10 ML: 5 INJECTION INTRAVENOUS at 11:32

## 2021-07-02 ENCOUNTER — ANESTHESIA EVENT (OUTPATIENT)
Dept: OPERATING ROOM | Age: 41
End: 2021-07-02
Payer: COMMERCIAL

## 2021-07-06 ENCOUNTER — HOSPITAL ENCOUNTER (OUTPATIENT)
Age: 41
Setting detail: OUTPATIENT SURGERY
Discharge: HOME | End: 2021-07-06
Attending: SURGERY | Admitting: SURGERY
Payer: COMMERCIAL

## 2021-07-06 ENCOUNTER — ANESTHESIA (OUTPATIENT)
Dept: OPERATING ROOM | Age: 41
End: 2021-07-06
Payer: COMMERCIAL

## 2021-07-06 VITALS
RESPIRATION RATE: 12 BRPM | BODY MASS INDEX: 37.18 KG/M2 | HEIGHT: 75 IN | WEIGHT: 299 LBS | SYSTOLIC BLOOD PRESSURE: 116 MMHG | OXYGEN SATURATION: 97 % | DIASTOLIC BLOOD PRESSURE: 68 MMHG | HEART RATE: 74 BPM | TEMPERATURE: 98.6 F

## 2021-07-06 VITALS — TEMPERATURE: 98.4 F | OXYGEN SATURATION: 89 % | SYSTOLIC BLOOD PRESSURE: 115 MMHG | DIASTOLIC BLOOD PRESSURE: 59 MMHG

## 2021-07-06 DIAGNOSIS — Z98.890 S/P HERNIA REPAIR: Primary | ICD-10-CM

## 2021-07-06 DIAGNOSIS — Z87.19 S/P HERNIA REPAIR: Primary | ICD-10-CM

## 2021-07-06 PROCEDURE — 3700000001 HC ADD 15 MINUTES (ANESTHESIA): Performed by: SURGERY

## 2021-07-06 PROCEDURE — 3700000000 HC ANESTHESIA ATTENDED CARE: Performed by: SURGERY

## 2021-07-06 PROCEDURE — 7100000011 HC PHASE II RECOVERY - ADDTL 15 MIN: Performed by: SURGERY

## 2021-07-06 PROCEDURE — 3600000002 HC SURGERY LEVEL 2 BASE: Performed by: SURGERY

## 2021-07-06 PROCEDURE — 7100000000 HC PACU RECOVERY - FIRST 15 MIN: Performed by: SURGERY

## 2021-07-06 PROCEDURE — 88305 TISSUE EXAM BY PATHOLOGIST: CPT

## 2021-07-06 PROCEDURE — 2709999900 HC NON-CHARGEABLE SUPPLY: Performed by: SURGERY

## 2021-07-06 PROCEDURE — 2580000003 HC RX 258: Performed by: SURGERY

## 2021-07-06 PROCEDURE — 49568 PR IMPLANT MESH HERNIA REPAIR/DEBRIDEMENT CLOSURE: CPT | Performed by: SURGERY

## 2021-07-06 PROCEDURE — 7100000010 HC PHASE II RECOVERY - FIRST 15 MIN: Performed by: SURGERY

## 2021-07-06 PROCEDURE — 6360000002 HC RX W HCPCS: Performed by: SURGERY

## 2021-07-06 PROCEDURE — 2500000003 HC RX 250 WO HCPCS: Performed by: NURSE ANESTHETIST, CERTIFIED REGISTERED

## 2021-07-06 PROCEDURE — 2580000003 HC RX 258: Performed by: NURSE ANESTHETIST, CERTIFIED REGISTERED

## 2021-07-06 PROCEDURE — 7100000001 HC PACU RECOVERY - ADDTL 15 MIN: Performed by: SURGERY

## 2021-07-06 PROCEDURE — 6360000002 HC RX W HCPCS: Performed by: NURSE ANESTHETIST, CERTIFIED REGISTERED

## 2021-07-06 PROCEDURE — 3600000012 HC SURGERY LEVEL 2 ADDTL 15MIN: Performed by: SURGERY

## 2021-07-06 PROCEDURE — 49561 PR REPAIR INCISIONAL HERNIA,STRANG: CPT | Performed by: SURGERY

## 2021-07-06 PROCEDURE — 2720000010 HC SURG SUPPLY STERILE: Performed by: SURGERY

## 2021-07-06 PROCEDURE — C1781 MESH (IMPLANTABLE): HCPCS | Performed by: SURGERY

## 2021-07-06 PROCEDURE — 2500000003 HC RX 250 WO HCPCS: Performed by: SURGERY

## 2021-07-06 DEVICE — PATCH HERN L DIA3.2IN CIR W/ STRP SEPRA TECHNOLOGY ABSRB: Type: IMPLANTABLE DEVICE | Site: ABDOMEN | Status: FUNCTIONAL

## 2021-07-06 RX ORDER — ONDANSETRON 4 MG/1
4 TABLET, FILM COATED ORAL EVERY 8 HOURS PRN
Qty: 30 TABLET | Refills: 0 | Status: SHIPPED | OUTPATIENT
Start: 2021-07-06 | End: 2021-07-16

## 2021-07-06 RX ORDER — DEXAMETHASONE SODIUM PHOSPHATE 10 MG/ML
INJECTION, SOLUTION INTRAMUSCULAR; INTRAVENOUS PRN
Status: DISCONTINUED | OUTPATIENT
Start: 2021-07-06 | End: 2021-07-06 | Stop reason: SDUPTHER

## 2021-07-06 RX ORDER — KETOROLAC TROMETHAMINE 30 MG/ML
INJECTION, SOLUTION INTRAMUSCULAR; INTRAVENOUS PRN
Status: DISCONTINUED | OUTPATIENT
Start: 2021-07-06 | End: 2021-07-06 | Stop reason: SDUPTHER

## 2021-07-06 RX ORDER — ROCURONIUM BROMIDE 10 MG/ML
INJECTION, SOLUTION INTRAVENOUS PRN
Status: DISCONTINUED | OUTPATIENT
Start: 2021-07-06 | End: 2021-07-06 | Stop reason: SDUPTHER

## 2021-07-06 RX ORDER — HYDROCODONE BITARTRATE AND ACETAMINOPHEN 5; 325 MG/1; MG/1
2 TABLET ORAL PRN
Status: DISCONTINUED | OUTPATIENT
Start: 2021-07-06 | End: 2021-07-06 | Stop reason: HOSPADM

## 2021-07-06 RX ORDER — SUCCINYLCHOLINE/SOD CL,ISO/PF 100 MG/5ML
SYRINGE (ML) INTRAVENOUS PRN
Status: DISCONTINUED | OUTPATIENT
Start: 2021-07-06 | End: 2021-07-06 | Stop reason: SDUPTHER

## 2021-07-06 RX ORDER — ONDANSETRON 2 MG/ML
INJECTION INTRAMUSCULAR; INTRAVENOUS PRN
Status: DISCONTINUED | OUTPATIENT
Start: 2021-07-06 | End: 2021-07-06 | Stop reason: SDUPTHER

## 2021-07-06 RX ORDER — SODIUM CHLORIDE 9 MG/ML
25 INJECTION, SOLUTION INTRAVENOUS PRN
Status: DISCONTINUED | OUTPATIENT
Start: 2021-07-06 | End: 2021-07-06 | Stop reason: HOSPADM

## 2021-07-06 RX ORDER — HYDROCODONE BITARTRATE AND ACETAMINOPHEN 5; 325 MG/1; MG/1
1 TABLET ORAL PRN
Status: DISCONTINUED | OUTPATIENT
Start: 2021-07-06 | End: 2021-07-06 | Stop reason: HOSPADM

## 2021-07-06 RX ORDER — PROPOFOL 10 MG/ML
INJECTION, EMULSION INTRAVENOUS PRN
Status: DISCONTINUED | OUTPATIENT
Start: 2021-07-06 | End: 2021-07-06 | Stop reason: SDUPTHER

## 2021-07-06 RX ORDER — LIDOCAINE HYDROCHLORIDE 10 MG/ML
1 INJECTION, SOLUTION EPIDURAL; INFILTRATION; INTRACAUDAL; PERINEURAL
Status: DISCONTINUED | OUTPATIENT
Start: 2021-07-07 | End: 2021-07-06 | Stop reason: HOSPADM

## 2021-07-06 RX ORDER — MECLIZINE HCL 12.5 MG/1
12.5 TABLET ORAL 3 TIMES DAILY PRN
COMMUNITY
End: 2021-11-12 | Stop reason: SDUPTHER

## 2021-07-06 RX ORDER — CYCLOBENZAPRINE HCL 10 MG
5 TABLET ORAL 3 TIMES DAILY PRN
Qty: 5 TABLET | Refills: 0 | Status: SHIPPED | OUTPATIENT
Start: 2021-07-06 | End: 2021-07-09

## 2021-07-06 RX ORDER — SODIUM CHLORIDE 0.9 % (FLUSH) 0.9 %
10 SYRINGE (ML) INJECTION PRN
Status: DISCONTINUED | OUTPATIENT
Start: 2021-07-06 | End: 2021-07-06 | Stop reason: HOSPADM

## 2021-07-06 RX ORDER — HYDROCODONE BITARTRATE AND ACETAMINOPHEN 5; 325 MG/1; MG/1
1 TABLET ORAL EVERY 4 HOURS PRN
Qty: 40 TABLET | Refills: 0 | Status: SHIPPED | OUTPATIENT
Start: 2021-07-06 | End: 2021-07-13

## 2021-07-06 RX ORDER — SENNA AND DOCUSATE SODIUM 50; 8.6 MG/1; MG/1
1 TABLET, FILM COATED ORAL DAILY
Qty: 10 TABLET | Refills: 0 | Status: SHIPPED | OUTPATIENT
Start: 2021-07-06 | End: 2021-07-16

## 2021-07-06 RX ORDER — LIDOCAINE HYDROCHLORIDE 20 MG/ML
INJECTION, SOLUTION INTRAVENOUS PRN
Status: DISCONTINUED | OUTPATIENT
Start: 2021-07-06 | End: 2021-07-06 | Stop reason: SDUPTHER

## 2021-07-06 RX ORDER — ONDANSETRON 2 MG/ML
4 INJECTION INTRAMUSCULAR; INTRAVENOUS
Status: DISCONTINUED | OUTPATIENT
Start: 2021-07-06 | End: 2021-07-06 | Stop reason: HOSPADM

## 2021-07-06 RX ORDER — FENTANYL CITRATE 50 UG/ML
INJECTION, SOLUTION INTRAMUSCULAR; INTRAVENOUS PRN
Status: DISCONTINUED | OUTPATIENT
Start: 2021-07-06 | End: 2021-07-06 | Stop reason: SDUPTHER

## 2021-07-06 RX ORDER — SODIUM CHLORIDE 0.9 % (FLUSH) 0.9 %
10 SYRINGE (ML) INJECTION EVERY 12 HOURS SCHEDULED
Status: DISCONTINUED | OUTPATIENT
Start: 2021-07-06 | End: 2021-07-06 | Stop reason: HOSPADM

## 2021-07-06 RX ORDER — SODIUM CHLORIDE, SODIUM LACTATE, POTASSIUM CHLORIDE, CALCIUM CHLORIDE 600; 310; 30; 20 MG/100ML; MG/100ML; MG/100ML; MG/100ML
INJECTION, SOLUTION INTRAVENOUS CONTINUOUS PRN
Status: DISCONTINUED | OUTPATIENT
Start: 2021-07-06 | End: 2021-07-06 | Stop reason: SDUPTHER

## 2021-07-06 RX ORDER — MIDAZOLAM HYDROCHLORIDE 1 MG/ML
INJECTION INTRAMUSCULAR; INTRAVENOUS PRN
Status: DISCONTINUED | OUTPATIENT
Start: 2021-07-06 | End: 2021-07-06 | Stop reason: SDUPTHER

## 2021-07-06 RX ORDER — FENTANYL CITRATE 50 UG/ML
50 INJECTION, SOLUTION INTRAMUSCULAR; INTRAVENOUS EVERY 5 MIN PRN
Status: DISCONTINUED | OUTPATIENT
Start: 2021-07-06 | End: 2021-07-06 | Stop reason: HOSPADM

## 2021-07-06 RX ORDER — FENTANYL CITRATE 50 UG/ML
25 INJECTION, SOLUTION INTRAMUSCULAR; INTRAVENOUS EVERY 5 MIN PRN
Status: DISCONTINUED | OUTPATIENT
Start: 2021-07-06 | End: 2021-07-06 | Stop reason: HOSPADM

## 2021-07-06 RX ORDER — CEPHALEXIN 500 MG/1
500 CAPSULE ORAL 4 TIMES DAILY
Qty: 20 CAPSULE | Refills: 0 | Status: SHIPPED | OUTPATIENT
Start: 2021-07-06 | End: 2021-07-11

## 2021-07-06 RX ORDER — SODIUM CHLORIDE 9 MG/ML
INJECTION, SOLUTION INTRAVENOUS CONTINUOUS
Status: DISCONTINUED | OUTPATIENT
Start: 2021-07-07 | End: 2021-07-06

## 2021-07-06 RX ORDER — SODIUM CHLORIDE, SODIUM LACTATE, POTASSIUM CHLORIDE, CALCIUM CHLORIDE 600; 310; 30; 20 MG/100ML; MG/100ML; MG/100ML; MG/100ML
INJECTION, SOLUTION INTRAVENOUS CONTINUOUS
Status: DISCONTINUED | OUTPATIENT
Start: 2021-07-07 | End: 2021-07-06 | Stop reason: HOSPADM

## 2021-07-06 RX ADMIN — Medication 50 MCG: at 09:22

## 2021-07-06 RX ADMIN — MIDAZOLAM 2 MG: 1 INJECTION INTRAMUSCULAR; INTRAVENOUS at 09:19

## 2021-07-06 RX ADMIN — Medication 100 MG: at 09:22

## 2021-07-06 RX ADMIN — SUGAMMADEX 200 MG: 100 INJECTION, SOLUTION INTRAVENOUS at 10:26

## 2021-07-06 RX ADMIN — PHENYLEPHRINE HYDROCHLORIDE 100 MCG: 10 INJECTION INTRAVENOUS at 09:56

## 2021-07-06 RX ADMIN — LIDOCAINE HYDROCHLORIDE 80 MG: 20 INJECTION, SOLUTION INTRAVENOUS at 09:22

## 2021-07-06 RX ADMIN — ROCURONIUM BROMIDE 10 MG: 10 INJECTION, SOLUTION INTRAVENOUS at 09:19

## 2021-07-06 RX ADMIN — ONDANSETRON 4 MG: 2 INJECTION, SOLUTION INTRAMUSCULAR; INTRAVENOUS at 09:57

## 2021-07-06 RX ADMIN — CEFAZOLIN SODIUM 3000 MG: 10 INJECTION, POWDER, FOR SOLUTION INTRAVENOUS at 09:19

## 2021-07-06 RX ADMIN — Medication 50 MCG: at 09:19

## 2021-07-06 RX ADMIN — PROPOFOL 200 MG: 10 INJECTION, EMULSION INTRAVENOUS at 09:22

## 2021-07-06 RX ADMIN — PHENYLEPHRINE HYDROCHLORIDE 100 MCG: 10 INJECTION INTRAVENOUS at 09:42

## 2021-07-06 RX ADMIN — Medication 50 MCG: at 10:18

## 2021-07-06 RX ADMIN — DEXAMETHASONE SODIUM PHOSPHATE 10 MG: 10 INJECTION INTRAMUSCULAR; INTRAVENOUS at 09:19

## 2021-07-06 RX ADMIN — SODIUM CHLORIDE, POTASSIUM CHLORIDE, SODIUM LACTATE AND CALCIUM CHLORIDE: 600; 310; 30; 20 INJECTION, SOLUTION INTRAVENOUS at 10:24

## 2021-07-06 RX ADMIN — ROCURONIUM BROMIDE 20 MG: 10 INJECTION, SOLUTION INTRAVENOUS at 09:28

## 2021-07-06 RX ADMIN — PHENYLEPHRINE HYDROCHLORIDE 200 MCG: 10 INJECTION INTRAVENOUS at 09:59

## 2021-07-06 RX ADMIN — KETOROLAC TROMETHAMINE 30 MG: 30 INJECTION, SOLUTION INTRAMUSCULAR; INTRAVENOUS at 09:57

## 2021-07-06 ASSESSMENT — PULMONARY FUNCTION TESTS
PIF_VALUE: 24
PIF_VALUE: 0
PIF_VALUE: 24
PIF_VALUE: 30
PIF_VALUE: 10
PIF_VALUE: 30
PIF_VALUE: 31
PIF_VALUE: 25
PIF_VALUE: 30
PIF_VALUE: 24
PIF_VALUE: 29
PIF_VALUE: 0
PIF_VALUE: 27
PIF_VALUE: 27
PIF_VALUE: 23
PIF_VALUE: 1
PIF_VALUE: 31
PIF_VALUE: 7
PIF_VALUE: 30
PIF_VALUE: 31
PIF_VALUE: 2
PIF_VALUE: 26
PIF_VALUE: 29
PIF_VALUE: 30
PIF_VALUE: 2
PIF_VALUE: 23
PIF_VALUE: 26
PIF_VALUE: 15
PIF_VALUE: 31
PIF_VALUE: 16
PIF_VALUE: 29
PIF_VALUE: 24
PIF_VALUE: 30
PIF_VALUE: 24
PIF_VALUE: 41
PIF_VALUE: 15
PIF_VALUE: 29
PIF_VALUE: 16
PIF_VALUE: 2
PIF_VALUE: 15
PIF_VALUE: 1
PIF_VALUE: 24
PIF_VALUE: 38
PIF_VALUE: 25
PIF_VALUE: 24
PIF_VALUE: 24
PIF_VALUE: 15
PIF_VALUE: 27
PIF_VALUE: 25
PIF_VALUE: 30
PIF_VALUE: 30
PIF_VALUE: 27
PIF_VALUE: 25
PIF_VALUE: 25
PIF_VALUE: 24
PIF_VALUE: 15
PIF_VALUE: 30
PIF_VALUE: 24
PIF_VALUE: 26
PIF_VALUE: 15
PIF_VALUE: 23
PIF_VALUE: 25
PIF_VALUE: 15
PIF_VALUE: 25
PIF_VALUE: 30
PIF_VALUE: 31
PIF_VALUE: 32
PIF_VALUE: 26
PIF_VALUE: 15

## 2021-07-06 ASSESSMENT — PAIN SCALES - GENERAL
PAINLEVEL_OUTOF10: 0

## 2021-07-06 ASSESSMENT — ENCOUNTER SYMPTOMS: SHORTNESS OF BREATH: 0

## 2021-07-06 ASSESSMENT — PAIN - FUNCTIONAL ASSESSMENT: PAIN_FUNCTIONAL_ASSESSMENT: 0-10

## 2021-07-06 NOTE — OP NOTE
Operative Note      Patient: Petr Hugo  YOB: 1980  MRN: [id number]    Date of Procedure: 7/6/2021    Pre-Op Diagnosis: DX VENTRAL HERNIA  ([facility])    Post-Op Diagnosis: Chronically incarcerated ventral hernia containing greater omentum       Procedure: Reduction and repair of chronically incarcerated ventral hernia containing greater omentum with mesh    Surgeon(s):  Jordan Dwyer MD    Assistant:   * No surgical staff found *    Anesthesia: General    Estimated Blood Loss (mL): Minimal    Complications: None    Specimens:   ID Type Source Tests Collected by Time Destination   A : OMENTUM AND HERNIA [facility] Tissue Abdomen SURGICAL PATHOLOGY Jordan Dwyer MD 7/6/2021 3700        Implants:  Implant Name Type Inv. Item Serial No.  Lot No. LRB No. Used Action   PATCH EFREN L DIA3. 2IN CIR W/ STRP [employer] ABSRB  PATCH EFREN L DIA3. 2IN CIR W/ STRP [employer] ABSRB  BARD DAVOL-WD [id number] N/A 1 Implanted         Drains: * No LDAs found *    Findings: Chronically incarcerated ventral hernia containing greater omentum    Indications for procedure: This is a very pleasant 49-year-old gentleman with a history of a large ventral hernia. Risks and benefits of reduction and repair of this ventral hernia with the possible use of mesh were discussed with the patient and verbal and written consent was obtained. Detailed Description of Procedure:   After verbal and written consent, the patient was brought to the operating room. After appropriate monitoring, general anesthesia was administered. A timeout was performed with the staff in the room confirming both the patient and the procedure to be performed. The procedure was begun with a sterile prep and drape. Local anesthesia was infiltrated into the skin and subcutaneous tissue of the supraumbilical abdominal wall. A transverse incision was made over a large firm hernia.   Dissection was carried down through the subcutaneous tissue with cautery. A large hernia sac extending from 2 separate defects in the ventral abdominal wall was identified. The hernia sac was scored circumferentially. I opened the hernia sac and identified the greater omentum which was chronically incarcerated within the hernia sac itself. I did use a LigaSure to divide the greater omentum. The hernia sac was also excised. A large circular Ventralex mesh was then selected for repair. This was placed below the fascia and secured in place with interrupted transfascial Prolene sutures. The straps of the mesh were then excised. The abdominal wall was imbricated over the mesh repair for coverage. Once hemostasis was assured, local anesthesia was infiltrated into the fascia and subcutaneous tissue. The deep subcutaneous tissue as well as deep dermal tissue were closed with interrupted Vicryl suture. The skin was closed with a running Monocryl suture. Dermabond was applied to the wound. A sterile dressing was applied. The sponge, lap, needle, and instrument count were correct at the end of the case. The patient was awakened from anesthesia and transported to the recovery in stable condition. There were no immediate complications.     Electronically signed by Freddy Loera MD on 7/6/2021 at 10:20 AM

## 2021-07-06 NOTE — FLOWSHEET NOTE
Incentive spirometer given to patient and education provided to pt and wife. Pt performed with ease. No complications. Denies SOB or dyspnea, tolerated well. Instructed to take home and perform at home.

## 2021-07-06 NOTE — ANESTHESIA POSTPROCEDURE EVALUATION
Department of Anesthesiology  Postprocedure Note    Patient: Petr Hugo  MRN: [id number]  YOB: 1980  Date of evaluation: 7/6/2021  Time:  3:23 PM     Procedure Summary     Date: 07/06/21 Room / Location: [address] [place] [address]    Anesthesia Start: 2482 Anesthesia Stop: 1038    Procedure: HERNIA VENTRAL REPAIR WITH MESH (N/A Abdomen) Diagnosis: (DX VENTRAL HERNIA  ([facility]))    Surgeons: Jordan Dwyer MD Responsible Provider: Brad Field MD    Anesthesia Type: general ASA Status: 2          Anesthesia Type: general    Ruth Phase I: Ruth Score: 10    Ruth Phase II: Ruth Score: 9    Last vitals: Reviewed and per EMR flowsheets.        Anesthesia Post Evaluation    Complications: no

## 2021-07-06 NOTE — ANESTHESIA PRE PROCEDURE
Department of Anesthesiology  Preprocedure Note       Name:  Jarod Greenwood   Age:  39 y.o.  :  1980                                          MRN:  [id number]         Date:  2021      Surgeon: Sandra Anderson):  Mireya Nix MD    Procedure: Procedure(s): HERNIA VENTRAL REPAIR    Medications prior to admission:   Prior to Admission medications    Medication Sig Start Date End Date Taking?  Authorizing Provider   Omega-3 Fatty Acids (FISH OIL PO) Take by mouth 2 times daily   Yes Historical Provider, MD   meclizine (ANTIVERT) 12.5 MG tablet Take 12.5 mg by mouth 3 times daily as needed   Yes Historical Provider, MD       Current medications:    Current Facility-Administered Medications   Medication Dose Route Frequency Provider Last Rate Last Admin    [START ON 2021] lactated ringers infusion   Intravenous Continuous Charlie Mcconnell, DO        sodium chloride flush 0.9 % injection 10 mL  10 mL Intravenous 2 times per day Charlie Mcconnell, DO        sodium chloride flush 0.9 % injection 10 mL  10 mL Intravenous PRN Shauna Blower, DO        0.9 % sodium chloride infusion  25 mL Intravenous PRN [place] Blower, DO        [START ON 2021] lidocaine PF 1 % injection 1 mL  1 mL Intradermal Once PRN Shauna Blower, DO           Allergies:  No Known Allergies    Problem List:    Patient Active Problem List   Diagnosis Code    Microcytic anemia D50.9    Class 2 obesity due to excess calories in adult E66.09       Past Medical History:        Diagnosis Date    Hyperlipidemia     Hypertension     Vertigo        Past Surgical History:        Procedure Laterality Date    WISDOM TOOTH EXTRACTION         Social History:    Social History     Tobacco Use    Smoking status: Never Smoker    Smokeless tobacco: Never Used   Substance Use Topics    Alcohol use: Yes     Comment: Socially                                Counseling given: Not Answered      Vital Signs (Current):   Vitals: Mallampati: I  TM distance: >3 FB   Neck ROM: full  Mouth opening: > = 3 FB Dental:          Pulmonary:       (-) shortness of breath                           Cardiovascular:        (-)  angina                Neuro/Psych:               GI/Hepatic/Renal:             Endo/Other:                     Abdominal:             Vascular: Other Findings:             Anesthesia Plan      general     ASA 2             Anesthetic plan and risks discussed with patient.                       Coy Hinojosa MD   7/6/2021

## 2021-07-06 NOTE — H&P
History and Physical Service   [facility] - INPATIENT    HISTORY AND PHYSICAL EXAMINATION            Date of Evaluation: 7/6/2021  Patient name:  Roddy Vegas  MRN:   [id number]  YOB: 1980  PCP:    Esau Corado MD    History Obtained From:     Patient, medical records    History of Present Illness: This is Roddy Vegas a 39 y.o. male who presents today for a ventral hernia repair by Dr. Pk Nino for ventral hernia. Patient has chief complaint of abdominal hernia which has been present the last 3 years and has been increasing in size. Patient complains of a dull, aching pain. Denies nausea, vomiting, changes in bowel habits. Patient is able to reduce hernia. He was evaluated by Dr. Pk Nino on 5/17/2021 where he recommended surgical intervention which he presents for today. Denies fever, chills, shortness of breath, cough, chest pain, open sores or wounds. Denies hx diabetes. Last Fish Oil 6/29/2021. Past Medical History:     Past Medical History:   Diagnosis Date    Hyperlipidemia     Hypertension     Vertigo         Past Surgical History:     Past Surgical History:   Procedure Laterality Date    WISDOM TOOTH EXTRACTION          Medications Prior to Admission:     Prior to Admission medications    Medication Sig Start Date End Date Taking? Authorizing Provider   Omega-3 Fatty Acids (FISH OIL PO) Take by mouth 2 times daily   Yes Historical Provider, MD   meclizine (ANTIVERT) 12.5 MG tablet Take 12.5 mg by mouth 3 times daily as needed   Yes Historical Provider, MD        Allergies:     Patient has no known allergies. Social History:     Tobacco:    reports that he has never smoked. He has never used smokeless tobacco.  Alcohol:      reports current alcohol use. Drug Use:  reports no history of drug use. Family History:     History reviewed. No pertinent family history. Review of Systems:     Positive and Negative as described in HPI.     CONSTITUTIONAL: negative for fevers, chills, sweats, fatigue, weight loss  HEENT:  negative for vision, hearing changes, runny nose, throat pain  RESPIRATORY:  negative for shortness of breath, cough, congestion, wheezing. CARDIOVASCULAR:  negative for chest pain, palpitations. GASTROINTESTINAL: Abdominal hernia negative for nausea, vomiting, diarrhea, constipation, change in bowel habits, abdominal pain   GENITOURINARY:  negative for difficulty of urination, burning with urination, frequency   INTEGUMENT:  negative for rash, skin lesions, easy bruising   HEMATOLOGIC/LYMPHATIC:  negative for swelling/edema   ALLERGIC/IMMUNOLOGIC:  negative for urticaria , itching  ENDOCRINE:  negative increase in drinking, increase in urination, hot or cold intolerance  MUSCULOSKELETAL:  negative joint pains, muscle aches, swelling of joints  NEUROLOGICAL:  negative for headaches, dizziness, lightheadedness, numbness, pain, tingling extremities  BEHAVIOR/PSYCH:  negative for depression, anxiety    Physical Exam:   BP (!) 156/85   Pulse 83   Temp 96.9 °F (36.1 °C) (Temporal)   Resp 22   Ht 6' 3\" (1.905 m)   Wt 299 lb (135.6 kg)   SpO2 96%   BMI 37.37 kg/m²   No results for input(s): POCGLU in the last 72 hours. General Appearance:  alert, well appearing, and in no acute distress obese  Mental status: oriented to person, place, and time with normal affect  Head:  normocephalic, atraumatic. Eye: no icterus, redness, pupils equal and reactive, extraocular eye movements intact, conjunctiva clear  Ear: normal external ear, no discharge, hearing intact  Nose:  no drainage noted  Mouth: mucous membranes moist  Neck: supple, no carotid bruits, thyroid not palpable  Lungs: Bilateral equal air entry, clear to ausculation, no wheezing, rales or rhonchi, normal effort  Cardiovascular: HR 83 normal rate, regular rhythm, no murmur, gallop, rub.   Abdomen: Large reducible abdominal hernia Soft, nontender, nondistended, normal bowel sounds  Neurologic: There are no new focal motor or sensory deficits, normal muscle tone and bulk, no abnormal sensation, normal speech, cranial nerves II through XII grossly intact  Skin: No gross lesions, rashes, bruising or bleeding on exposed skin area  Extremities:  peripheral pulses palpable, no pedal edema or calf pain with palpation  Psych: normal affect     Investigations:      Laboratory Testing:  No results found for this or any previous visit (from the past 24 hour(s)). No results for input(s): HGB, HCT, WBC, MCV, PLATELET, NA, K, CL, CO2, BUN, CREATININE, GLUCOSE, INR, PROTIME, APTT, AST, ALT, LABALBU, HCG in the last 720 hours. No results for input(s): COVID19 in the last 720 hours. Imaging/Diagnostics:    CT ABDOMEN PELVIS W IV CONTRAST Additional Contrast? Oral    Result Date: 6/17/2021  EXAMINATION: CT OF THE ABDOMEN AND PELVIS WITH CONTRAST 6/17/2021 11:17 am TECHNIQUE: CT of the abdomen and pelvis was performed with the administration of intravenous contrast. Multiplanar reformatted images are provided for review. Dose modulation, iterative reconstruction, and/or weight based adjustment of the mA/kV was utilized to reduce the radiation dose to as low as reasonably achievable. COMPARISON: None. HISTORY: ORDERING SYSTEM PROVIDED HISTORY: Microcytic anemia TECHNOLOGIST PROVIDED HISTORY: abdo mass iwht anemia Reason for Exam: Anterior abdominal hernia x 3 years, has been increasing in size. No reported pain, constipation or vomiting. States having surgery July 10. No prior surgeries. Acuity: Acute Type of Exam: Initial FINDINGS: Lower Chest: No acute findings. Organs: Liver portal vein gallbladder pancreas spleen adrenal glands all appear unremarkable. No enhancing renal mass or hydronephrosis. Abdominal aorta appears normal in caliber. GI/Bowel: Stomach is grossly unremarkable. Small bowel appears nondilated. Oral contrast reached the colon. No acute colonic abnormality. Sigmoid diverticulosis.   Appendix is normal. Pelvis: Urinary bladder is grossly unremarkable. Prostate gland is normal in size. Peritoneum/Retroperitoneum: No free air, free fluid or lymphadenopathy. Bones/Soft Tissues: Abdominal wall demonstrates uncomplicated midline fat filled ventral hernia. Neck measures approximately 3.8 cm. Osseous structures demonstrate degenerative change. * No acute intra-abdominal process. *Uncomplicated midline fat filled ventral hernia. Diagnosis:      1. Ventral hernia    Plans:     1.  Ventral hernia repair      TARA Baker - CNP  7/6/2021  8:46 AM

## 2021-07-07 LAB — SURGICAL PATHOLOGY REPORT: NORMAL

## 2021-07-15 ENCOUNTER — TELEPHONE (OUTPATIENT)
Dept: SURGERY | Age: 41
End: 2021-07-15

## 2021-07-15 ENCOUNTER — TELEPHONE (OUTPATIENT)
Dept: INTERNAL MEDICINE CLINIC | Age: 41
End: 2021-07-15

## 2021-07-19 ENCOUNTER — HOSPITAL ENCOUNTER (OUTPATIENT)
Dept: SURGERY | Age: 41
Discharge: HOME | End: 2021-07-19
Payer: COMMERCIAL

## 2021-07-19 PROCEDURE — 99024 POSTOP FOLLOW-UP VISIT: CPT | Performed by: SURGERY

## 2021-07-19 PROCEDURE — 99211 OFF/OP EST MAY X REQ PHY/QHP: CPT

## 2021-07-19 NOTE — PROGRESS NOTES
[facility] \[facility] [place] [facility]\"" General Surgery Clinic  Progress Note        NAME:  Karis Bear  MRN: [id number]   YOB: 1980   Date: 7/19/2021   Age: 39 y.o. Gender: male     There is no height or weight on file to calculate BMI. Chief Complaint: S/P ventral hernia repair    History of Present Illness:  Mr. Stanley Jiménez was seen in the office today following recent chronically incarcerated ventral hernia repair with mesh. His incision appears to be healing well at this point without signs of erythema or infection. The patient has been compliant with his activity restrictions including no heavy lifting or straining. He denies any difficulty with voiding or problems with constipation. Current Outpatient Medications on File Prior to Encounter   Medication Sig Dispense Refill    Omega-3 Fatty Acids (FISH OIL PO) Take by mouth 2 times daily      meclizine (ANTIVERT) 12.5 MG tablet Take 12.5 mg by mouth 3 times daily as needed       No current facility-administered medications on file prior to encounter. No Known Allergies    Past Medical History:   Diagnosis Date    Hyperlipidemia     Hypertension     Vertigo        Past Surgical History:   Procedure Laterality Date    VENTRAL HERNIA REPAIR N/A 7/6/2021    HERNIA VENTRAL REPAIR WITH MESH performed by Mejia Pereira MD at [address]         No family history on file.      Social History     Socioeconomic History    Marital status:      Spouse name: Not on file    Number of children: Not on file    Years of education: Not on file    Highest education level: Not on file   Occupational History    Not on file   Tobacco Use    Smoking status: Never Smoker    Smokeless tobacco: Never Used   Substance and Sexual Activity    Alcohol use: Yes     Comment: Socially    Drug use: Never    Sexual activity: Yes     Partners: Female   Other Topics Concern    Not on file   Social History Narrative    Not on file     Social Determinants of Health     Financial Resource Strain:     Difficulty of Paying Living Expenses:    Food Insecurity:     Worried About Running Out of Food in the Last Year:     [address] in the Last Year:    Transportation Needs:     Lack of Transportation (Medical):  Lack of Transportation (Non-Medical):    Physical Activity:     Days of Exercise per Week:     Minutes of Exercise per Session:    Stress:     Feeling of Stress :    Social Connections:     Frequency of Communication with Friends and Family:     Frequency of Social Gatherings with Friends and Family:     Attends Mandaeism Services:     Active Member of Clubs or Organizations:     Attends Club or Organization Meetings:     Marital Status:    Intimate Partner Violence:     Fear of Current or Ex-Partner:     Emotionally Abused:     Physically Abused:     Sexually Abused:        Review of Systems - History obtained from chart review and the patient    Wt 312 Lbs    No intake/output data recorded.     General Appearance: alert and oriented to person, place and time, well-developed and well-nourished, in no acute distress  Skin: warm and dry, no rash or erythema  Head: normocephalic and atraumatic  Eyes: pupils equal, round, and reactive to light, extraocular eye movements intact, conjunctivae normal  ENT: hearing grossly normal bilaterally  Neck: neck supple and non tender without mass, no thyromegaly or thyroid nodules, no cervical lymphadenopathy   Pulmonary/Chest: clear to auscultation bilaterally- no wheezes, rales or rhonchi, normal air movement, no respiratory distress  Cardiovascular: normal rate, normal S1 and S2, no gallops, intact distal pulses and no carotid bruits  Abdomen: Soft, appropriately tender, supraumbilical incision healing well at this point without signs of erythema or infection, no evidence of hernia recurrence, induration as expected  Extremities: no cyanosis, no clubbing and Nancy's sign negative bilaterally    Hemoglobin   Date Value Ref Range Status   05/11/2021 9.5 (L) 13.0 - 17.0 g/dL Final     Hematocrit   Date Value Ref Range Status   05/11/2021 33.8 (L) 40.7 - 50.3 % Final     WBC   Date Value Ref Range Status   05/11/2021 8.1 3.5 - 11.3 k/uL Final     Sodium   Date Value Ref Range Status   05/11/2021 135 135 - 144 mmol/L Final     Potassium   Date Value Ref Range Status   05/11/2021 4.2 3.7 - 5.3 mmol/L Final     Chloride   Date Value Ref Range Status   05/11/2021 100 98 - 107 mmol/L Final     CO2   Date Value Ref Range Status   05/11/2021 25 20 - 31 mmol/L Final     BUN   Date Value Ref Range Status   05/11/2021 8 6 - 20 mg/dL Final     Glucose   Date Value Ref Range Status   05/11/2021 112 (H) 70 - 99 mg/dL Final          Assessment: Status post chronically incarcerated ventral hernia repair with mesh    Plan: At this point, the patient appears to be healing as expected following recent open chronically incarcerated ventral hernia repair with mesh. I discussed with the patient my recommendations for continuing with his current activity restrictions. I discussed with the patient follow-up with us in the office in a few weeks for final wound assessment prior to releasing without any restrictions. The patient was provided the office number for contact, should any questions or concerns arise.     Electronically signed by Rimma Donaldson MD on 7/19/2021 at 1:13 PM

## 2021-08-16 ENCOUNTER — HOSPITAL ENCOUNTER (OUTPATIENT)
Dept: SURGERY | Age: 41
Discharge: HOME | End: 2021-08-16
Payer: COMMERCIAL

## 2021-08-16 PROCEDURE — 99024 POSTOP FOLLOW-UP VISIT: CPT | Performed by: SURGERY

## 2021-08-16 PROCEDURE — 99202 OFFICE O/P NEW SF 15 MIN: CPT

## 2021-08-16 NOTE — PROGRESS NOTES
[facility] General Surgery Clinic  Progress Note        NAME:  Prema Bhardwaj  MRN: [id number]   YOB: 1980   Date: 8/16/2021   Age: 39 y.o. Gender: male     There is no height or weight on file to calculate BMI. Chief Complaint: S/P incarcerated hernia repair with mesh    History of Present Illness:  Mr. Tianna Duffy was seen in the office today following recent incarcerated ventral hernia repair with mesh. His incisions all appear to be healing well. The patient has been compliant with his activity restrictions including no heavy lifting or straining. He denies any difficulty with voiding or problems with constipation. Current Outpatient Medications on File Prior to Encounter   Medication Sig Dispense Refill    Omega-3 Fatty Acids (FISH OIL PO) Take by mouth 2 times daily      meclizine (ANTIVERT) 12.5 MG tablet Take 12.5 mg by mouth 3 times daily as needed       No current facility-administered medications on file prior to encounter. No Known Allergies    Past Medical History:   Diagnosis Date    Hyperlipidemia     Hypertension     Vertigo        Past Surgical History:   Procedure Laterality Date    VENTRAL HERNIA REPAIR N/A 7/6/2021    HERNIA VENTRAL REPAIR WITH MESH performed by Rimma Donaldson MD at [facility] EXTRACTION         History reviewed. No pertinent family history.      Social History     Socioeconomic History    Marital status:      Spouse name: None    Number of children: None    Years of education: None    Highest education level: None   Occupational History    None   Tobacco Use    Smoking status: Never Smoker    Smokeless tobacco: Never Used   Substance and Sexual Activity    Alcohol use: Yes     Comment: Socially    Drug use: Never    Sexual activity: Yes     Partners: Female   Other Topics Concern    None   Social History Narrative    None     Social Determinants of Health     Financial Resource Strain: Low Risk     Difficulty of Paying Living Expenses: Not hard at all   Food Insecurity: No Food Insecurity    Worried About [address] in the Last Year: Never true    Ran Out of Food in the Last Year: Never true   Transportation Needs:     Lack of Transportation (Medical):  Lack of Transportation (Non-Medical):    Physical Activity:     Days of Exercise per Week:     Minutes of Exercise per Session:    Stress:     Feeling of Stress :    Social Connections:     Frequency of Communication with Friends and Family:     Frequency of Social Gatherings with Friends and Family:     Attends Taoist Services:     Active Member of Clubs or Organizations:     Attends Club or Organization Meetings:     Marital Status:    Intimate Partner Violence:     Fear of Current or Ex-Partner:     Emotionally Abused:     Physically Abused:     Sexually Abused:        Review of Systems - History obtained from chart review and the patient    There were no vitals filed for this visit. No intake/output data recorded.     Abdomen: Abdomen soft, appropriately tender, surgical incisions well-healed without signs of erythema or infection, no evidence of hernia recurrence    Hemoglobin   Date Value Ref Range Status   05/11/2021 9.5 (L) 13.0 - 17.0 g/dL Final     Hematocrit   Date Value Ref Range Status   05/11/2021 33.8 (L) 40.7 - 50.3 % Final     WBC   Date Value Ref Range Status   05/11/2021 8.1 3.5 - 11.3 k/uL Final     Sodium   Date Value Ref Range Status   05/11/2021 135 135 - 144 mmol/L Final     Potassium   Date Value Ref Range Status   05/11/2021 4.2 3.7 - 5.3 mmol/L Final     Chloride   Date Value Ref Range Status   05/11/2021 100 98 - 107 mmol/L Final     CO2   Date Value Ref Range Status   05/11/2021 25 20 - 31 mmol/L Final     BUN   Date Value Ref Range Status   05/11/2021 8 6 - 20 mg/dL Final     Glucose   Date Value Ref Range Status   05/11/2021 112 (H) 70 - 99 mg/dL Final          Assessment: Status post incarcerated ventral

## 2021-11-13 RX ORDER — MECLIZINE HCL 12.5 MG/1
12.5 TABLET ORAL 3 TIMES DAILY PRN
Qty: 30 TABLET | Refills: 1 | Status: SHIPPED | OUTPATIENT
Start: 2021-11-13 | End: 2022-02-21 | Stop reason: SDUPTHER

## 2021-11-18 ENCOUNTER — OFFICE VISIT (OUTPATIENT)
Dept: INTERNAL MEDICINE CLINIC | Age: 41
End: 2021-11-18
Payer: COMMERCIAL

## 2021-11-18 VITALS
HEIGHT: 75 IN | WEIGHT: 289 LBS | BODY MASS INDEX: 35.93 KG/M2 | DIASTOLIC BLOOD PRESSURE: 86 MMHG | SYSTOLIC BLOOD PRESSURE: 136 MMHG | OXYGEN SATURATION: 96 % | HEART RATE: 80 BPM

## 2021-11-18 DIAGNOSIS — Z13.1 ENCOUNTER FOR SCREENING FOR DIABETES MELLITUS: ICD-10-CM

## 2021-11-18 DIAGNOSIS — E66.01 CLASS 2 SEVERE OBESITY DUE TO EXCESS CALORIES WITH SERIOUS COMORBIDITY AND BODY MASS INDEX (BMI) OF 37.0 TO 37.9 IN ADULT (HCC): ICD-10-CM

## 2021-11-18 DIAGNOSIS — B95.8 STAPH INFECTION: ICD-10-CM

## 2021-11-18 DIAGNOSIS — D50.9 IRON DEFICIENCY ANEMIA, UNSPECIFIED IRON DEFICIENCY ANEMIA TYPE: Primary | ICD-10-CM

## 2021-11-18 PROCEDURE — 99214 OFFICE O/P EST MOD 30 MIN: CPT | Performed by: INTERNAL MEDICINE

## 2021-11-18 PROCEDURE — G8484 FLU IMMUNIZE NO ADMIN: HCPCS | Performed by: INTERNAL MEDICINE

## 2021-11-18 PROCEDURE — 1036F TOBACCO NON-USER: CPT | Performed by: INTERNAL MEDICINE

## 2021-11-18 PROCEDURE — G8417 CALC BMI ABV UP PARAM F/U: HCPCS | Performed by: INTERNAL MEDICINE

## 2021-11-18 PROCEDURE — G8427 DOCREV CUR MEDS BY ELIG CLIN: HCPCS | Performed by: INTERNAL MEDICINE

## 2021-11-18 RX ORDER — CLINDAMYCIN HYDROCHLORIDE 300 MG/1
300 CAPSULE ORAL 3 TIMES DAILY
Qty: 30 CAPSULE | Refills: 0 | Status: SHIPPED | OUTPATIENT
Start: 2021-11-18 | End: 2021-11-28

## 2021-11-18 ASSESSMENT — ENCOUNTER SYMPTOMS
BLOOD IN STOOL: 0
TROUBLE SWALLOWING: 0
EYE PAIN: 0
ABDOMINAL DISTENTION: 0
COLOR CHANGE: 0
EYE DISCHARGE: 0
COUGH: 0
SHORTNESS OF BREATH: 0
DIARRHEA: 0
WHEEZING: 0

## 2021-11-18 NOTE — PROGRESS NOTES
[address] [id number]  Dept: [phone]  Dept Fax: [phone]    Vivian Flores is a 39 y.o. male who presents today for his medical conditions/complaintsas noted below. Vivian Flores is c/o of   Chief Complaint   Patient presents with    Anemia    Eczema     on head          HPI:     Scalp infection  Skin tags        No results found for: LABA1C          ( goal A1Cis < 7)   No results found for: LABMICR  LDL Cholesterol (mg/dL)   Date Value   06/22/2018 188 (H)       (goal LDL is <100)   AST (U/L)   Date Value   05/11/2021 54 (H)     ALT (U/L)   Date Value   05/11/2021 26     BUN (mg/dL)   Date Value   05/11/2021 8     BP Readings from Last 3 Encounters:   11/18/21 136/86   07/21/21 (!) 150/86   07/06/21 (!) 115/59          (goal 120/80)    Past Medical History:   Diagnosis Date    Hyperlipidemia     Hypertension     Vertigo       Past Surgical History:   Procedure Laterality Date    VENTRAL HERNIA REPAIR N/A 7/6/2021    HERNIA VENTRAL REPAIR WITH MESH performed by Olga Graham MD at [address]         No family history on file. Social History     Tobacco Use    Smoking status: Never Smoker    Smokeless tobacco: Never Used   Substance Use Topics    Alcohol use: Yes     Comment: Socially      Current Outpatient Medications   Medication Sig Dispense Refill    clindamycin (CLEOCIN) 300 MG capsule Take 1 capsule by mouth 3 times daily for 10 days 30 capsule 0    meclizine (ANTIVERT) 12.5 MG tablet Take 1 tablet by mouth 3 times daily as needed for Dizziness or Nausea 30 tablet 1    Omega-3 Fatty Acids (FISH OIL PO) Take by mouth 2 times daily       No current facility-administered medications for this visit.      No Known Allergies    Health Maintenance   Topic Date Due    Hepatitis C screen  Never done    Varicella vaccine (1 of 2 - 2-dose childhood series) Never done    HIV screen  Never done    Diabetes screen  04/14/2020    COVID-19 Vaccine (2 - Booster for Josue series) 08/09/2021    Flu vaccine (1) Never done    DTaP/Tdap/Td vaccine (3 - Td or Tdap) 05/23/2023    Lipid screen  06/22/2023    Hepatitis A vaccine  Aged Out    Hepatitis B vaccine  Aged Out    Hib vaccine  Aged Out    Meningococcal (ACWY) vaccine  Aged Out    Pneumococcal 0-64 years Vaccine  Aged Out       Subjective:     Review of Systems   Constitutional: Negative for appetite change, diaphoresis and fatigue. HENT: Negative for ear discharge and trouble swallowing. Eyes: Negative for pain and discharge. Respiratory: Negative for cough, shortness of breath and wheezing. Cardiovascular: Negative for chest pain and palpitations. Gastrointestinal: Negative for abdominal distention, blood in stool and diarrhea. Endocrine: Negative for polydipsia and polyphagia. Genitourinary: Negative for difficulty urinating and frequency. Musculoskeletal: Negative for gait problem, myalgias and neck pain. Skin: Negative for color change and rash. Scalp infection  Skin tags     Allergic/Immunologic: Negative for environmental allergies and food allergies. Neurological: Negative for dizziness and headaches. Hematological: Negative for adenopathy. Does not bruise/bleed easily. Psychiatric/Behavioral: Negative for behavioral problems and sleep disturbance. Objective:     Physical Exam  Constitutional:       Appearance: He is well-developed. He is not diaphoretic. HENT:      Head: Normocephalic and atraumatic. Eyes:      General:         Right eye: No discharge. Left eye: No discharge. Extraocular Movements:      Right eye: Normal extraocular motion. Left eye: Normal extraocular motion. Conjunctiva/sclera: Conjunctivae normal.      Right eye: Right conjunctiva is not injected. Left eye: Left conjunctiva is not injected. Neck:      Thyroid: No thyroid mass or thyromegaly.       Vascular: No JVD.   Cardiovascular:      Rate and Rhythm: Normal rate and regular rhythm. Heart sounds: No murmur heard. No friction rub. Pulmonary:      Effort: Pulmonary effort is normal. No tachypnea, bradypnea, accessory muscle usage or respiratory distress. Breath sounds: Normal breath sounds. No wheezing or rales. Abdominal:      General: Bowel sounds are normal. There is no distension. Palpations: Abdomen is soft. Tenderness: There is no abdominal tenderness. There is no rebound. Comments: Post laptrosocpy mesh repair abdo hernia     Musculoskeletal:         General: No tenderness. Normal range of motion. Cervical back: Normal range of motion and neck supple. No edema or erythema. Lymphadenopathy:      Head:      Right side of head: No submental or submandibular adenopathy. Left side of head: No submental or submandibular adenopathy. Cervical: No cervical adenopathy. Skin:     General: Skin is warm. Coloration: Skin is not pale. Findings: No bruising, ecchymosis or rash. Neurological:      Mental Status: He is alert and oriented to person, place, and time. Cranial Nerves: No cranial nerve deficit. Sensory: No sensory deficit. Motor: No atrophy or abnormal muscle tone. Coordination: Coordination normal.   Psychiatric:         Mood and Affect: Mood is not anxious. Affect is not angry. Speech: Speech is not slurred. Behavior: Behavior normal. Behavior is not aggressive. Thought Content: Thought content does not include homicidal ideation. Cognition and Memory: Memory is not impaired. /86   Pulse 80   Ht 6' 3\" (1.905 m)   Wt 289 lb (131.1 kg)   SpO2 96%   BMI 36.12 kg/m²     Assessment:       Diagnosis Orders   1.  Iron deficiency anemia, unspecified iron deficiency anemia type  Iron And TIBC    CBC Auto Differential    Vitamin B12 & Folate    Celiac Disease Panel    [facility] Robert [facility]   2. Encounter for screening for diabetes mellitus  Glucose, Fasting    Hemoglobin A1C   3. Class 2 severe obesity due to excess calories with serious comorbidity and body mass index (BMI) of 37.0 to 37.9 in adult [facility])     4. Staph infection               Plan:      Return in about 2 months (around 1/18/2022). Orders Placed This Encounter   Procedures    Glucose, Fasting     Standing Status:   Future     Standing Expiration Date:   11/17/2022    Iron And TIBC     Standing Status:   Future     Standing Expiration Date:   11/18/2022     Order Specific Question:   Is Patient Fasting? Answer:   15     Order Specific Question:   No of Hours? Answer:   12 hour    CBC Auto Differential     Standing Status:   Future     Standing Expiration Date:   2/16/2022    Vitamin B12 & Folate     Standing Status:   Future     Standing Expiration Date:   11/18/2022    Celiac Disease Panel     Standing Status:   Future     Standing Expiration Date:   11/18/2022    Hemoglobin A1C     Standing Status:   Future     Standing Expiration Date:   2/16/2022   Jair Anderson MD, [facility]     Referral Priority:   Routine     Referral Type:   Eval and Treat     Referral Reason:   Specialty Services Required     Referred to Provider:   Karleen Rubinstein, MD     Requested Specialty:   Gastroenterology     Number of Visits Requested:   1     Orders Placed This Encounter   Medications    clindamycin (CLEOCIN) 300 MG capsule     Sig: Take 1 capsule by mouth 3 times daily for 10 days     Dispense:  30 capsule     Refill:  0       Patient given educational materials - see patient instructions. Discussed use, benefit, and side effects of prescribed medications. All patientquestions answered. Pt voiced understanding. Reviewed health maintenance. Instructedto continue current medications, diet and exercise. Patient agreed with treatmentplan. Follow up as directed.      Electronically signed by Bailey Bean MD on 11/18/2021 at 11:57 AM

## 2021-11-18 NOTE — PROGRESS NOTES
Visit Information    Have you changed or started any medications since your last visit including any over-the-counter medicines, vitamins, or herbal medicines? no   Are you having any side effects from any of your medications? -  no  Have you stopped taking any of your medications? Is so, why? -  no    Have you seen any other physician or provider since your last visit? No  Have you had any other diagnostic tests since your last visit? No  Have you been seen in the emergency room and/or had an admission to a hospital since we last saw you? No  Have you had your routine dental cleaning in the past 6 months? no    Have you activated your [employer] account? If not, what are your barriers?  Yes     Patient Care Team:  Conner Vance MD as PCP - General (Internal Medicine)  Conner Vance MD as PCP - [facility]    Medical History Review  Past Medical, Family, and Social History reviewed and does contribute to the patient presenting condition    Health Maintenance   Topic Date Due    Hepatitis C screen  Never done    Varicella vaccine (1 of 2 - 2-dose childhood series) Never done    HIV screen  Never done    Diabetes screen  04/14/2020    COVID-19 Vaccine (2 - Booster for Josue series) 08/09/2021    Flu vaccine (1) Never done    DTaP/Tdap/Td vaccine (3 - Td or Tdap) 05/23/2023    Lipid screen  06/22/2023    Hepatitis A vaccine  Aged Out    Hepatitis B vaccine  Aged Out    Hib vaccine  Aged Out    Meningococcal (ACWY) vaccine  Aged Out    Pneumococcal 0-64 years Vaccine  Aged Out     '

## 2021-12-16 ENCOUNTER — TELEPHONE (OUTPATIENT)
Dept: INTERNAL MEDICINE CLINIC | Age: 41
End: 2021-12-16

## 2021-12-16 DIAGNOSIS — J06.9 URTI (ACUTE UPPER RESPIRATORY INFECTION): Primary | ICD-10-CM

## 2021-12-16 RX ORDER — AZITHROMYCIN 250 MG/1
250 TABLET, FILM COATED ORAL SEE ADMIN INSTRUCTIONS
Qty: 6 TABLET | Refills: 0 | Status: SHIPPED | OUTPATIENT
Start: 2021-12-16 | End: 2021-12-21

## 2021-12-16 NOTE — TELEPHONE ENCOUNTER
Coughing up a lot of mucous. Using OTC and just not helping a lot. Was tested for COVID and was negative on Tuesday. Requesting antibiotic please and maybe something for the cough? ?    No Known Allergies

## 2022-02-04 ENCOUNTER — HOSPITAL ENCOUNTER (OUTPATIENT)
Age: 42
Setting detail: SPECIMEN
Discharge: HOME | End: 2022-02-04

## 2022-02-04 DIAGNOSIS — Z13.1 ENCOUNTER FOR SCREENING FOR DIABETES MELLITUS: ICD-10-CM

## 2022-02-04 DIAGNOSIS — D50.9 IRON DEFICIENCY ANEMIA, UNSPECIFIED IRON DEFICIENCY ANEMIA TYPE: ICD-10-CM

## 2022-02-04 LAB
ABSOLUTE EOS #: 0.52 K/UL (ref 0–0.44)
ABSOLUTE IMMATURE GRANULOCYTE: <0.03 K/UL (ref 0–0.3)
ABSOLUTE LYMPH #: 1.66 K/UL (ref 1.1–3.7)
ABSOLUTE MONO #: 0.75 K/UL (ref 0.1–1.2)
BASOPHILS # BLD: 1 % (ref 0–2)
BASOPHILS ABSOLUTE: 0.09 K/UL (ref 0–0.2)
DIFFERENTIAL TYPE: ABNORMAL
EOSINOPHILS RELATIVE PERCENT: 8 % (ref 1–4)
FOLATE: 7.8 NG/ML
GLUCOSE FASTING: 135 MG/DL (ref 70–99)
HCT VFR BLD CALC: 50.6 % (ref 40.7–50.3)
HEMOGLOBIN: 16.1 G/DL (ref 13–17)
IMMATURE GRANULOCYTES: 0 %
IRON SATURATION: 25 % (ref 20–55)
IRON: 85 UG/DL (ref 59–158)
LYMPHOCYTES # BLD: 24 % (ref 24–43)
MCH RBC QN AUTO: 32.1 PG (ref 25.2–33.5)
MCHC RBC AUTO-ENTMCNC: 31.8 G/DL (ref 28.4–34.8)
MCV RBC AUTO: 101 FL (ref 82.6–102.9)
MONOCYTES # BLD: 11 % (ref 3–12)
NRBC AUTOMATED: 0 PER 100 WBC
PDW BLD-RTO: 14.1 % (ref 11.8–14.4)
PLATELET # BLD: 144 K/UL (ref 138–453)
PLATELET ESTIMATE: ABNORMAL
PMV BLD AUTO: 10.6 FL (ref 8.1–13.5)
RBC # BLD: 5.01 M/UL (ref 4.21–5.77)
RBC # BLD: ABNORMAL 10*6/UL
SEG NEUTROPHILS: 56 % (ref 36–65)
SEGMENTED NEUTROPHILS ABSOLUTE COUNT: 3.78 K/UL (ref 1.5–8.1)
TOTAL IRON BINDING CAPACITY: 341 UG/DL (ref 250–450)
UNSATURATED IRON BINDING CAPACITY: 256 UG/DL (ref 112–347)
VITAMIN B-12: 544 PG/ML (ref 232–1245)
WBC # BLD: 6.8 K/UL (ref 3.5–11.3)
WBC # BLD: ABNORMAL 10*3/UL

## 2022-02-06 LAB
GLIADIN DEAMINIDATED PEPTIDE AB IGA: 3.5 U/ML
GLIADIN DEAMINIDATED PEPTIDE AB IGG: 0.6 U/ML
IGA: 621 MG/DL (ref 70–400)
TISSUE TRANSGLUTAMINASE IGA: 2.3 U/ML

## 2022-02-15 ENCOUNTER — TELEPHONE (OUTPATIENT)
Dept: GASTROENTEROLOGY | Age: 42
End: 2022-02-15

## 2022-02-15 ENCOUNTER — OFFICE VISIT (OUTPATIENT)
Dept: GASTROENTEROLOGY | Age: 42
End: 2022-02-15
Payer: COMMERCIAL

## 2022-02-15 VITALS
HEART RATE: 75 BPM | BODY MASS INDEX: 35.99 KG/M2 | SYSTOLIC BLOOD PRESSURE: 145 MMHG | WEIGHT: 287.9 LBS | DIASTOLIC BLOOD PRESSURE: 100 MMHG | TEMPERATURE: 97.2 F

## 2022-02-15 DIAGNOSIS — E66.01 MORBID OBESITY (HCC): ICD-10-CM

## 2022-02-15 DIAGNOSIS — K62.5 RECTAL BLEEDING: Primary | ICD-10-CM

## 2022-02-15 DIAGNOSIS — Z87.19 HX OF VENTRAL HERNIA REPAIR: ICD-10-CM

## 2022-02-15 DIAGNOSIS — Z98.890 HX OF VENTRAL HERNIA REPAIR: ICD-10-CM

## 2022-02-15 DIAGNOSIS — D50.8 OTHER IRON DEFICIENCY ANEMIA: ICD-10-CM

## 2022-02-15 PROBLEM — D64.9 ABSOLUTE ANEMIA: Status: ACTIVE | Noted: 2022-02-15

## 2022-02-15 PROBLEM — F10.11 HISTORY OF ETOH ABUSE: Status: ACTIVE | Noted: 2022-02-15

## 2022-02-15 PROCEDURE — G8417 CALC BMI ABV UP PARAM F/U: HCPCS | Performed by: INTERNAL MEDICINE

## 2022-02-15 PROCEDURE — G8427 DOCREV CUR MEDS BY ELIG CLIN: HCPCS | Performed by: INTERNAL MEDICINE

## 2022-02-15 PROCEDURE — 1036F TOBACCO NON-USER: CPT | Performed by: INTERNAL MEDICINE

## 2022-02-15 PROCEDURE — 99204 OFFICE O/P NEW MOD 45 MIN: CPT | Performed by: INTERNAL MEDICINE

## 2022-02-15 PROCEDURE — G8484 FLU IMMUNIZE NO ADMIN: HCPCS | Performed by: INTERNAL MEDICINE

## 2022-02-15 ASSESSMENT — ENCOUNTER SYMPTOMS
DIARRHEA: 0
COUGH: 0
ABDOMINAL DISTENTION: 0
VOMITING: 0
SORE THROAT: 0
SHORTNESS OF BREATH: 0
CONSTIPATION: 0
COLOR CHANGE: 0
WHEEZING: 0
TROUBLE SWALLOWING: 0
ABDOMINAL PAIN: 0
RECTAL PAIN: 0
CHOKING: 0
NAUSEA: 0
BLOOD IN STOOL: 1
ANAL BLEEDING: 0

## 2022-02-15 NOTE — TELEPHONE ENCOUNTER
Colonoscopy ordered at office visit 2/15/22. Pt did not have time to schedule at check out and stated he would call the office to get it scheduled. Pt also needs 3 mo f/u.

## 2022-02-15 NOTE — PROGRESS NOTES
GI NEW PATIENT OFFICE VISIT    INTERVAL HISTORY:   Delio Wright MD  [address] [facility]  [address] [facility],  [address] [facility]    Chief Complaint   Patient presents with    New Patient     Pt is here as a NP for Anemia       1. Rectal bleeding    2. Other iron deficiency anemia    3. Morbid obesity ([address]    4. Hx of ventral hernia repair        This patient evaluated in my office for the first time  He has history significant multiple medical issues  Apparently had a large ventral hernia last year underwent a surgery  It appears that after that his hemoglobin was rather low  Was found to be iron deficiency anemia  He was started on iron pills  His current hemoglobin 16    Apparently has history for some rectal bleeding off-and-on basis as well  Never had GI work-up done in the past  Denies significant GERD dysphagia nausea vomiting hematemesis    Also has been checked for celiac sprue serology which is negative    No current smoking alcohol abuse illicit drug usage    No known family history for colon cancer    Some abdominal bloating gas off-and-on cramping  Mild irritable bowel syndrome-like symptoms      HISTORY OF PRESENT ILLNESS: Mr.Adam Garold Fermo Fenton Homans is a 39 y.o. male with a past history remarkable for , referred for evaluation of   Chief Complaint   Patient presents with    New Patient     Pt is here as a NP for Anemia   . Past Medical,Family, and Social History reviewed and does contribute to the patient presenting condition. Patient's PMH/PSH,SH,PSYCH Hx, MEDs, ALLERGIES, and ROS were all reviewed and updated in the appropriate sections.     PAST MEDICAL HISTORY:  Past Medical History:   Diagnosis Date    Hyperlipidemia     Hypertension     Vertigo        Past Surgical History:   Procedure Laterality Date    VENTRAL HERNIA REPAIR N/A 7/6/2021    HERNIA VENTRAL REPAIR WITH MESH performed by Aubrey Ma, MD at [address] [place] [address]:    Current Outpatient Medications:     meclizine (ANTIVERT) 12.5 MG tablet, Take 1 tablet by mouth 3 times daily as needed for Dizziness or Nausea, Disp: 30 tablet, Rfl: 1    Omega-3 Fatty Acids (FISH OIL PO), Take by mouth 2 times daily, Disp: , Rfl:     ALLERGIES:   No Known Allergies    FAMILY HISTORY: No family history on file. SOCIAL HISTORY:   Social History     Socioeconomic History    Marital status:      Spouse name: Not on file    Number of children: Not on file    Years of education: Not on file    Highest education level: Not on file   Occupational History    Not on file   Tobacco Use    Smoking status: Never Smoker    Smokeless tobacco: Never Used   Substance and Sexual Activity    Alcohol use: Yes     Comment: Socially    Drug use: Never    Sexual activity: Yes     Partners: Female   Other Topics Concern    Not on file   Social History Narrative    Not on file     Social Determinants of Health     Financial Resource Strain: Low Risk     Difficulty of Paying Living Expenses: Not hard at all   Food Insecurity: No Food Insecurity    Worried About Running Out of Food in the Last Year: Never true    [address] in the Last Year: Never true   Transportation Needs:     Lack of Transportation (Medical): Not on file    Lack of Transportation (Non-Medical):  Not on file   Physical Activity:     Days of Exercise per Week: Not on file    Minutes of Exercise per Session: Not on file   Stress:     Feeling of Stress : Not on file   Social Connections:     Frequency of Communication with Friends and Family: Not on file    Frequency of Social Gatherings with Friends and Family: Not on file    Attends Adventism Services: Not on file    Active Member of Clubs or Organizations: Not on file    Attends Club or Organization Meetings: Not on file    Marital Status: Not on file   Intimate Partner Violence:     Fear of Current or Ex-Partner: Not on file    Emotionally Abused: Not on file    Physically Abused: Not on file    Sexually Abused: Not on file   Housing Stability:     Unable to Pay for Housing in the Last Year: Not on file    Number of [place] in the Last Year: Not on file    Unstable Housing in the Last Year: Not on file         REVIEW OF SYSTEMS:         Review of Systems   Constitutional: Negative for appetite change, fatigue and unexpected weight change. HENT: Negative for sore throat and trouble swallowing. Eyes: Negative for visual disturbance. Respiratory: Negative for cough, choking, shortness of breath and wheezing. Cardiovascular: Negative for chest pain, palpitations and leg swelling. Gastrointestinal: Positive for blood in stool (Occasionally). Negative for abdominal distention, abdominal pain, anal bleeding, constipation, diarrhea, nausea, rectal pain and vomiting. Skin: Negative for color change. Allergic/Immunologic: Negative for environmental allergies and food allergies. Neurological: Negative for dizziness, weakness, numbness and headaches. Hematological: Does not bruise/bleed easily. Psychiatric/Behavioral: Negative for confusion and sleep disturbance. The patient is not nervous/anxious. PHYSICAL EXAMINATION: Vital signs reviewed per the nursing documentation. BP (!) 145/100   Pulse 75   Temp 97.2 °F (36.2 °C)   Wt 287 lb 14.4 oz (130.6 kg)   BMI 35.99 kg/m²   Body mass index is 35.99 kg/m². Physical Exam  Nursing note reviewed. Constitutional:       Appearance: He is well-developed. Comments: Anxious   Obesity    HENT:      Head: Normocephalic and atraumatic. Eyes:      Conjunctiva/sclera: Conjunctivae normal.      Pupils: Pupils are equal, round, and reactive to light. Cardiovascular:      Rate and Rhythm: Normal rate and regular rhythm. Pulmonary:      Effort: Pulmonary effort is normal.      Breath sounds: Normal breath sounds. Abdominal:      General: Bowel sounds are normal.      Palpations: Abdomen is soft. Comments: NON TENDER, NON DISTENTED  LIVER SPLEEN AND HERNIAS ARE NOT  PALPABLE  BOWEL SOUNDS ARE POSITIVE      Scar from previous surgery    Genitourinary:     Rectum: Normal.   Musculoskeletal:         General: Normal range of motion. Cervical back: Normal range of motion and neck supple. Skin:     General: Skin is warm. Neurological:      Mental Status: He is alert and oriented to person, place, and time. Deep Tendon Reflexes: Reflexes are normal and symmetric. LABORATORY DATA: Reviewed  Lab Results   Component Value Date    WBC 6.8 02/04/2022    HGB 16.1 02/04/2022    HCT 50.6 (H) 02/04/2022    .0 02/04/2022     02/04/2022     05/11/2021    K 4.2 05/11/2021     05/11/2021    CO2 25 05/11/2021    BUN 8 05/11/2021    CREATININE 0.64 (L) 05/11/2021    LABALBU 3.6 05/11/2021    BILITOT 0.71 05/11/2021    ALKPHOS 72 05/11/2021    AST 54 (H) 05/11/2021    ALT 26 05/11/2021         Lab Results   Component Value Date    RBC 5.01 02/04/2022    HGB 16.1 02/04/2022    .0 02/04/2022    MCH 32.1 02/04/2022    MCHC 31.8 02/04/2022    RDW 14.1 02/04/2022    MPV 10.6 02/04/2022    BASOPCT 1 02/04/2022    LYMPHSABS 1.66 02/04/2022    MONOSABS 0.75 02/04/2022    NEUTROABS 3.78 02/04/2022    EOSABS 0.52 (H) 02/04/2022    BASOSABS 0.09 02/04/2022         DIAGNOSTIC TESTING:     No results found. Assessment  1. Rectal bleeding    2. Other iron deficiency anemia    3. Morbid obesity ([address]    4.  Hx of ventral hernia repair        Plan    Plan Colonoscopy     The Endoscopic procedure was explained to the patient in detail  The prep and NPO were explained  All the Risks, Benefits, and Alternatives were explained  Risk of Bleeding, Perforation and Cardio Respiratory risks were explained  his questions were answered  The procedure has been scheduled with the  in the office  Patient was asked to give us a call for any questions  The patient has verbalized understanding and agreement to this plan. Pt was advised in detail about some life style and dietary modifications. He was advised about avoidance of caffeine, nicotine and chocolate. Pt was also told to stay away from any kind of fast foods, soda pops. He was also advised to avoid lots of spices, grease and fried food etc.     Instructions were also given about trying to arrange the timing, quality and quantity of food. Instructions were given about using ample amount of fiber including dietary and supplemental fiber either metamucil, bennafiber or citrucell etc.  Pt was advised about drinking ample amount of water without any colors or chemicals. Stress was given about regular exercise. Pt has verbalized understanding and agreement to these modifications. The patient was instructed to start taking some OTC Probiotics products   These are available over the counter at the Pharmacy stores and Grocery stores  He was explained about the beneficial effects they have in the GI track  They will help to establish the good bacterial shayna and will help with the digestion and bowel movements  The patient has verbalized understanding and agreement to this plan    Pt was given advices and instructions about weight loss. He was advised about the significance of exercise at least three times a week . Dietary advices were also given about the avoidance of fast food, fried food and lots of spices and grease. nstructions were given about using ample amount of fiber including dietary and supplemental fiber either metamucil, bennafiber or citrucell etc.  Pt was advised about drinking ample amount of water without any colors or chemicals. Stress was given about regular exercise. Pt was told to stay way from soda pops.     Pt has verbalized understanding and agrrement        Thank you for allowing me to participate in the care of Mr. Balbir Palm. For any further questions please do not hesitate to contact me. I have reviewed and agree with the ROS entered by the MA/Nurse.          Em Matias MD, Yo Caceres  Board Certified in Gastroenterology and [address] [place] [address] Gastroenterology  Office #: [phone]

## 2022-02-18 RX ORDER — POLYETHYLENE GLYCOL 3350, SODIUM SULFATE ANHYDROUS, SODIUM BICARBONATE, SODIUM CHLORIDE, POTASSIUM CHLORIDE 236; 22.74; 6.74; 5.86; 2.97 G/4L; G/4L; G/4L; G/4L; G/4L
4 POWDER, FOR SOLUTION ORAL ONCE
Qty: 4000 ML | Refills: 0 | Status: SHIPPED | OUTPATIENT
Start: 2022-02-18 | End: 2022-02-18

## 2022-02-18 NOTE — TELEPHONE ENCOUNTER
Writer called and spoke to pt to discuss scheduling. Pt denies having any CKD or being on any blood thinners. Pt now scheduled STA colon F AHmad Thurs 3/24/22 @ 930am golytely-cm- vaccinated. Reviewed bowel prep instructions with patient over phone and mailed to home address and sent via my chart.

## 2022-02-21 ENCOUNTER — OFFICE VISIT (OUTPATIENT)
Dept: INTERNAL MEDICINE CLINIC | Age: 42
End: 2022-02-21
Payer: COMMERCIAL

## 2022-02-21 VITALS
DIASTOLIC BLOOD PRESSURE: 84 MMHG | HEIGHT: 75 IN | OXYGEN SATURATION: 98 % | BODY MASS INDEX: 35.19 KG/M2 | WEIGHT: 283 LBS | SYSTOLIC BLOOD PRESSURE: 132 MMHG | HEART RATE: 82 BPM

## 2022-02-21 DIAGNOSIS — Z98.890 HX OF VENTRAL HERNIA REPAIR: ICD-10-CM

## 2022-02-21 DIAGNOSIS — E66.01 MORBID OBESITY (HCC): ICD-10-CM

## 2022-02-21 DIAGNOSIS — R42 VERTIGO: ICD-10-CM

## 2022-02-21 DIAGNOSIS — Z87.19 HX OF VENTRAL HERNIA REPAIR: ICD-10-CM

## 2022-02-21 DIAGNOSIS — R06.00 PND (PAROXYSMAL NOCTURNAL DYSPNEA): ICD-10-CM

## 2022-02-21 DIAGNOSIS — I10 PRIMARY HYPERTENSION: Primary | ICD-10-CM

## 2022-02-21 PROBLEM — D64.9 ABSOLUTE ANEMIA: Status: RESOLVED | Noted: 2022-02-15 | Resolved: 2022-02-21

## 2022-02-21 PROCEDURE — G8427 DOCREV CUR MEDS BY ELIG CLIN: HCPCS | Performed by: INTERNAL MEDICINE

## 2022-02-21 PROCEDURE — 1036F TOBACCO NON-USER: CPT | Performed by: INTERNAL MEDICINE

## 2022-02-21 PROCEDURE — G8484 FLU IMMUNIZE NO ADMIN: HCPCS | Performed by: INTERNAL MEDICINE

## 2022-02-21 PROCEDURE — 99214 OFFICE O/P EST MOD 30 MIN: CPT | Performed by: INTERNAL MEDICINE

## 2022-02-21 PROCEDURE — G8417 CALC BMI ABV UP PARAM F/U: HCPCS | Performed by: INTERNAL MEDICINE

## 2022-02-21 RX ORDER — FLUTICASONE PROPIONATE 50 MCG
1 SPRAY, SUSPENSION (ML) NASAL DAILY
Qty: 16 G | Refills: 6 | Status: SHIPPED | OUTPATIENT
Start: 2022-02-21 | End: 2023-02-21

## 2022-02-21 RX ORDER — MECLIZINE HYDROCHLORIDE 25 MG/1
25 TABLET ORAL DAILY PRN
Qty: 90 TABLET | Refills: 1 | Status: SHIPPED | OUTPATIENT
Start: 2022-02-21

## 2022-02-21 ASSESSMENT — ENCOUNTER SYMPTOMS
WHEEZING: 0
TROUBLE SWALLOWING: 0
COLOR CHANGE: 0
EYE PAIN: 0
COUGH: 0
EYE DISCHARGE: 0
ABDOMINAL DISTENTION: 0
SHORTNESS OF BREATH: 0
BLOOD IN STOOL: 0
DIARRHEA: 0

## 2022-02-21 NOTE — PROGRESS NOTES
Visit Information    Have you changed or started any medications since your last visit including any over-the-counter medicines, vitamins, or herbal medicines? no   Are you having any side effects from any of your medications? -  no  Have you stopped taking any of your medications? Is so, why? -  no    Have you seen any other physician or provider since your last visit? No  Have you had any other diagnostic tests since your last visit? Yes - Records Obtained  Have you been seen in the emergency room and/or had an admission to a hospital since we last saw you? No  Have you had your routine dental cleaning in the past 6 months? yes -     Have you activated your [employer] account? If not, what are your barriers?  Yes     Patient Care Team:  Satnam Ashby MD as PCP - General (Internal Medicine)  Satnam Ashby MD as PCP - [facility]  Inocente Hwang MD as Consulting Physician (Gastroenterology)    Medical History Review  Past Medical, Family, and Social History reviewed and does contribute to the patient presenting condition    Health Maintenance   Topic Date Due    Hepatitis C screen  Never done    Varicella vaccine (1 of 2 - 2-dose childhood series) Never done    HIV screen  Never done    COVID-19 Vaccine (2 - Booster for [employer] series) 08/09/2021    Flu vaccine (1) Never done    Depression Screen  06/01/2022    DTaP/Tdap/Td vaccine (3 - Td or Tdap) 05/23/2023    Lipid screen  06/22/2023    Diabetes screen  02/04/2025    Hepatitis A vaccine  Aged Out    Hepatitis B vaccine  Aged Out    Hib vaccine  Aged Out    Meningococcal (ACWY) vaccine  Aged Out    Pneumococcal 0-64 years Vaccine  Aged Out

## 2022-02-21 NOTE — PROGRESS NOTES
[address] [address]  Dept: [phone]  Dept Fax: [phone]    Avani Guerrero is a 39 y.o. male who presents today for his medical conditions/complaintsas noted below. Avani Guerrero is c/o of   Chief Complaint   Patient presents with    Anemia         HPI:     HTN  Onset more than 2 years ago  samantha mild to mod  Controlled with current po meds  Not associated with headaches or blurry vision  No chest pain        No results found for: LABA1C          ( goal A1Cis < 7)   No results found for: LABMICR  LDL Cholesterol (mg/dL)   Date Value   06/22/2018 188 (H)       (goal LDL is <100)   AST (U/L)   Date Value   05/11/2021 54 (H)     ALT (U/L)   Date Value   05/11/2021 26     BUN (mg/dL)   Date Value   05/11/2021 8     BP Readings from Last 3 Encounters:   02/21/22 132/84   02/15/22 (!) 145/100   11/18/21 136/86          (goal 120/80)    Past Medical History:   Diagnosis Date    Hyperlipidemia     Hypertension     Vertigo       Past Surgical History:   Procedure Laterality Date    VENTRAL HERNIA REPAIR N/A 7/6/2021    HERNIA VENTRAL REPAIR WITH MESH performed by Leandro Corcoran MD at [address]         No family history on file. Social History     Tobacco Use    Smoking status: Never Smoker    Smokeless tobacco: Never Used   Substance Use Topics    Alcohol use: Yes     Comment: Socially      Current Outpatient Medications   Medication Sig Dispense Refill    meclizine (ANTIVERT) 25 MG tablet Take 1 tablet by mouth daily as needed for Dizziness or Nausea 90 tablet 1    fluticasone (FLONASE) 50 MCG/ACT nasal spray 1 spray by Nasal route daily 16 g 6    Omega-3 Fatty Acids (FISH OIL PO) Take by mouth 2 times daily       No current facility-administered medications for this visit.      No Known Allergies    Health Maintenance   Topic Date Due    Hepatitis C screen  Never done    Varicella vaccine (1 of 2 - 2-dose childhood series) Never done    HIV screen  Never done    COVID-19 Vaccine (2 - Booster for [employer] series) 08/09/2021    Flu vaccine (1) Never done    Depression Screen  06/01/2022    DTaP/Tdap/Td vaccine (3 - Td or Tdap) 05/23/2023    Lipid screen  06/22/2023    Diabetes screen  02/04/2025    Hepatitis A vaccine  Aged Out    Hepatitis B vaccine  Aged Out    Hib vaccine  Aged Out    Meningococcal (ACWY) vaccine  Aged Out    Pneumococcal 0-64 years Vaccine  Aged Out       Subjective:     Review of Systems   Constitutional: Negative for appetite change, diaphoresis and fatigue. HENT: Negative for ear discharge and trouble swallowing. Eyes: Negative for pain and discharge. Respiratory: Negative for cough, shortness of breath and wheezing. Cardiovascular: Negative for chest pain and palpitations. Gastrointestinal: Negative for abdominal distention, blood in stool and diarrhea. Endocrine: Negative for polydipsia and polyphagia. Genitourinary: Negative for difficulty urinating and frequency. Musculoskeletal: Positive for arthralgias. Negative for gait problem, myalgias and neck pain. Skin: Negative for color change and rash. Allergic/Immunologic: Negative for environmental allergies and food allergies. Neurological: Positive for dizziness. Negative for headaches. Hematological: Negative for adenopathy. Does not bruise/bleed easily. Psychiatric/Behavioral: Negative for behavioral problems and sleep disturbance. Objective:     Physical Exam  Constitutional:       Appearance: He is well-developed. He is not diaphoretic. Comments: Obese     HENT:      Head: Normocephalic and atraumatic. Eyes:      General:         Right eye: No discharge. Left eye: No discharge. Extraocular Movements:      Right eye: Normal extraocular motion. Left eye: Normal extraocular motion.       Conjunctiva/sclera: Conjunctivae normal.      Right eye: Right conjunctiva is not injected. Left eye: Left conjunctiva is not injected. Neck:      Thyroid: No thyroid mass or thyromegaly. Vascular: No JVD. Cardiovascular:      Rate and Rhythm: Normal rate and regular rhythm. Heart sounds: No murmur heard. No friction rub. Pulmonary:      Effort: Pulmonary effort is normal. No tachypnea, bradypnea, accessory muscle usage or respiratory distress. Breath sounds: Normal breath sounds. No wheezing or rales. Abdominal:      General: Bowel sounds are normal. There is no distension. Palpations: Abdomen is soft. Tenderness: There is no abdominal tenderness. There is no rebound. Musculoskeletal:         General: No tenderness. Normal range of motion. Cervical back: Normal range of motion and neck supple. No edema or erythema. Lymphadenopathy:      Head:      Right side of head: No submental or submandibular adenopathy. Left side of head: No submental or submandibular adenopathy. Cervical: No cervical adenopathy. Skin:     General: Skin is warm. Coloration: Skin is not pale. Findings: No bruising, ecchymosis or rash. Neurological:      Mental Status: He is alert and oriented to person, place, and time. Cranial Nerves: No cranial nerve deficit. Sensory: No sensory deficit. Motor: No atrophy or abnormal muscle tone. Coordination: Coordination normal.   Psychiatric:         Mood and Affect: Mood is not anxious. Affect is not angry. Speech: Speech is not slurred. Behavior: Behavior normal. Behavior is not aggressive. Thought Content: Thought content does not include homicidal ideation. Cognition and Memory: Memory is not impaired. /84   Pulse 82   Ht 6' 3\" (1.905 m)   Wt 283 lb (128.4 kg)   SpO2 98%   BMI 35.37 kg/m²     Assessment:       Diagnosis Orders   1. Primary hypertension     2. Morbid obesity ([facility] [address] [address]     3. Hx of ventral hernia repair     4. Vertigo     5.  PND (paroxysmal nocturnal dyspnea)               Plan:      No follow-ups on file. No orders of the defined types were placed in this encounter. Orders Placed This Encounter   Medications    meclizine (ANTIVERT) 25 MG tablet     Sig: Take 1 tablet by mouth daily as needed for Dizziness or Nausea     Dispense:  90 tablet     Refill:  1    fluticasone (FLONASE) 50 MCG/ACT nasal spray     Si spray by Nasal route daily     Dispense:  16 g     Refill:  6    don't take Antivert with driving or with alcohol or any other sedative  Can cause drowsiness   Anemia resoled  Also has some occaasionla blood in stool  Due for colon march  likely from hernia surg  Hg is  16  Due to get colon  mcv is 101 advised to cut back on alcohol     Patient given educational materials - see patient instructions. Discussed use, benefit, and side effects of prescribed medications. All patientquestions answered. Pt voiced understanding. Reviewed health maintenance. Instructedto continue current medications, diet and exercise. Patient agreed with treatmentplan. Follow up as directed.      Electronically signed by Elviar Mclaughlin MD on 2022 at 1:43 PM

## 2022-03-16 ENCOUNTER — TELEPHONE (OUTPATIENT)
Dept: GASTROENTEROLOGY | Age: 42
End: 2022-03-16

## 2022-03-16 RX ORDER — POLYETHYLENE GLYCOL 3350, SODIUM SULFATE ANHYDROUS, SODIUM BICARBONATE, SODIUM CHLORIDE, POTASSIUM CHLORIDE 236; 22.74; 6.74; 5.86; 2.97 G/4L; G/4L; G/4L; G/4L; G/4L
4 POWDER, FOR SOLUTION ORAL ONCE
Qty: 4000 ML | Refills: 0 | Status: SHIPPED | OUTPATIENT
Start: 2022-03-16 | End: 2022-03-16

## 2022-03-16 NOTE — TELEPHONE ENCOUNTER
Writer returned call and spoke to pt to discuss scheduling. 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 Pt now rescheduled STA F ahmad colon Thurs 4/21/22 @ 1145am golytely-cm- vaccinated. Reviewed bowel prep instructions with patient over phone and mailed to home address and sent via [employer] message.

## 2022-03-25 ENCOUNTER — TELEPHONE (OUTPATIENT)
Dept: INTERNAL MEDICINE CLINIC | Age: 42
End: 2022-03-25

## 2022-03-25 DIAGNOSIS — J34.89 SINUS PRESSURE: Primary | ICD-10-CM

## 2022-03-25 RX ORDER — CETIRIZINE HYDROCHLORIDE 10 MG/1
10 TABLET ORAL DAILY
Qty: 30 TABLET | Refills: 0 | Status: ON HOLD | OUTPATIENT
Start: 2022-03-25 | End: 2022-04-21

## 2022-03-25 NOTE — TELEPHONE ENCOUNTER
Would not recommend taking meclizine and zyrtec at the same time, they are similar medications. Both antihistamines.  Recommend taking one or the other

## 2022-03-25 NOTE — TELEPHONE ENCOUNTER
Sick Call    Orrie Pack   1980   [phone]   Mustapha Lopez MD   No Known Allergies     Last Visit: 02/21/22    Reason for No Same Day Appt: unable--none available    Complaint: patient using flonase nasal spray--his ears and head still feel full--causing some dizziness. Is there anything you can prescribe to help this?       Pharmacy: [facility]    Dr Snowden's patient he is off

## 2022-04-20 ENCOUNTER — ANESTHESIA EVENT (OUTPATIENT)
Dept: OPERATING ROOM | Age: 42
End: 2022-04-20
Payer: COMMERCIAL

## 2022-04-21 ENCOUNTER — HOSPITAL ENCOUNTER (OUTPATIENT)
Age: 42
Setting detail: OUTPATIENT SURGERY
Discharge: HOME | End: 2022-04-21
Attending: INTERNAL MEDICINE | Admitting: INTERNAL MEDICINE
Payer: COMMERCIAL

## 2022-04-21 ENCOUNTER — ANESTHESIA (OUTPATIENT)
Dept: OPERATING ROOM | Age: 42
End: 2022-04-21
Payer: COMMERCIAL

## 2022-04-21 VITALS
SYSTOLIC BLOOD PRESSURE: 119 MMHG | BODY MASS INDEX: 35.93 KG/M2 | OXYGEN SATURATION: 95 % | TEMPERATURE: 97.2 F | HEART RATE: 66 BPM | WEIGHT: 289 LBS | HEIGHT: 75 IN | DIASTOLIC BLOOD PRESSURE: 82 MMHG | RESPIRATION RATE: 16 BRPM

## 2022-04-21 VITALS — DIASTOLIC BLOOD PRESSURE: 66 MMHG | SYSTOLIC BLOOD PRESSURE: 117 MMHG | OXYGEN SATURATION: 91 %

## 2022-04-21 PROCEDURE — 7100000010 HC PHASE II RECOVERY - FIRST 15 MIN: Performed by: INTERNAL MEDICINE

## 2022-04-21 PROCEDURE — 88305 TISSUE EXAM BY PATHOLOGIST: CPT

## 2022-04-21 PROCEDURE — 2580000003 HC RX 258: Performed by: ANESTHESIOLOGY

## 2022-04-21 PROCEDURE — 45380 COLONOSCOPY AND BIOPSY: CPT | Performed by: INTERNAL MEDICINE

## 2022-04-21 PROCEDURE — 6360000002 HC RX W HCPCS: Performed by: NURSE ANESTHETIST, CERTIFIED REGISTERED

## 2022-04-21 PROCEDURE — 2709999900 HC NON-CHARGEABLE SUPPLY: Performed by: INTERNAL MEDICINE

## 2022-04-21 PROCEDURE — 2580000003 HC RX 258: Performed by: NURSE ANESTHETIST, CERTIFIED REGISTERED

## 2022-04-21 PROCEDURE — 3700000000 HC ANESTHESIA ATTENDED CARE: Performed by: INTERNAL MEDICINE

## 2022-04-21 PROCEDURE — 3609010300 HC COLONOSCOPY W/BIOPSY SINGLE/MULTIPLE: Performed by: INTERNAL MEDICINE

## 2022-04-21 PROCEDURE — 3700000001 HC ADD 15 MINUTES (ANESTHESIA): Performed by: INTERNAL MEDICINE

## 2022-04-21 PROCEDURE — 2500000003 HC RX 250 WO HCPCS: Performed by: NURSE ANESTHETIST, CERTIFIED REGISTERED

## 2022-04-21 PROCEDURE — 7100000011 HC PHASE II RECOVERY - ADDTL 15 MIN: Performed by: INTERNAL MEDICINE

## 2022-04-21 RX ORDER — SODIUM CHLORIDE, SODIUM LACTATE, POTASSIUM CHLORIDE, CALCIUM CHLORIDE 600; 310; 30; 20 MG/100ML; MG/100ML; MG/100ML; MG/100ML
INJECTION, SOLUTION INTRAVENOUS CONTINUOUS
Status: DISCONTINUED | OUTPATIENT
Start: 2022-04-22 | End: 2022-04-21 | Stop reason: HOSPADM

## 2022-04-21 RX ORDER — SODIUM CHLORIDE, SODIUM LACTATE, POTASSIUM CHLORIDE, CALCIUM CHLORIDE 600; 310; 30; 20 MG/100ML; MG/100ML; MG/100ML; MG/100ML
INJECTION, SOLUTION INTRAVENOUS CONTINUOUS PRN
Status: DISCONTINUED | OUTPATIENT
Start: 2022-04-21 | End: 2022-04-21 | Stop reason: SDUPTHER

## 2022-04-21 RX ORDER — KETAMINE HCL IN NACL, ISO-OSM 100MG/10ML
SYRINGE (ML) INJECTION PRN
Status: DISCONTINUED | OUTPATIENT
Start: 2022-04-21 | End: 2022-04-21 | Stop reason: SDUPTHER

## 2022-04-21 RX ORDER — KETAMINE HCL IN NACL, ISO-OSM 100MG/10ML
SYRINGE (ML) INJECTION PRN
Status: DISCONTINUED | OUTPATIENT
Start: 2022-04-21 | End: 2022-04-21

## 2022-04-21 RX ORDER — LIDOCAINE HYDROCHLORIDE 10 MG/ML
INJECTION, SOLUTION EPIDURAL; INFILTRATION; INTRACAUDAL; PERINEURAL PRN
Status: DISCONTINUED | OUTPATIENT
Start: 2022-04-21 | End: 2022-04-21 | Stop reason: SDUPTHER

## 2022-04-21 RX ORDER — SODIUM CHLORIDE 0.9 % (FLUSH) 0.9 %
5-40 SYRINGE (ML) INJECTION EVERY 12 HOURS SCHEDULED
Status: DISCONTINUED | OUTPATIENT
Start: 2022-04-21 | End: 2022-04-21 | Stop reason: HOSPADM

## 2022-04-21 RX ORDER — PROPOFOL 10 MG/ML
INJECTION, EMULSION INTRAVENOUS PRN
Status: DISCONTINUED | OUTPATIENT
Start: 2022-04-21 | End: 2022-04-21 | Stop reason: SDUPTHER

## 2022-04-21 RX ORDER — LIDOCAINE HYDROCHLORIDE 10 MG/ML
1 INJECTION, SOLUTION EPIDURAL; INFILTRATION; INTRACAUDAL; PERINEURAL
Status: DISCONTINUED | OUTPATIENT
Start: 2022-04-22 | End: 2022-04-21 | Stop reason: HOSPADM

## 2022-04-21 RX ORDER — SODIUM CHLORIDE 0.9 % (FLUSH) 0.9 %
5-40 SYRINGE (ML) INJECTION PRN
Status: DISCONTINUED | OUTPATIENT
Start: 2022-04-21 | End: 2022-04-21 | Stop reason: HOSPADM

## 2022-04-21 RX ORDER — SODIUM CHLORIDE 9 MG/ML
INJECTION, SOLUTION INTRAVENOUS PRN
Status: DISCONTINUED | OUTPATIENT
Start: 2022-04-21 | End: 2022-04-21 | Stop reason: HOSPADM

## 2022-04-21 RX ADMIN — Medication 10 MG: at 12:13

## 2022-04-21 RX ADMIN — Medication 10 MG: at 12:01

## 2022-04-21 RX ADMIN — SODIUM CHLORIDE, POTASSIUM CHLORIDE, SODIUM LACTATE AND CALCIUM CHLORIDE: 600; 310; 30; 20 INJECTION, SOLUTION INTRAVENOUS at 10:46

## 2022-04-21 RX ADMIN — PROPOFOL 50 MG: 10 INJECTION, EMULSION INTRAVENOUS at 12:10

## 2022-04-21 RX ADMIN — LIDOCAINE HYDROCHLORIDE 50 MG: 10 INJECTION, SOLUTION EPIDURAL; INFILTRATION; INTRACAUDAL; PERINEURAL at 11:54

## 2022-04-21 RX ADMIN — PROPOFOL 50 MG: 10 INJECTION, EMULSION INTRAVENOUS at 11:59

## 2022-04-21 RX ADMIN — Medication 20 MG: at 11:59

## 2022-04-21 RX ADMIN — PROPOFOL 50 MG: 10 INJECTION, EMULSION INTRAVENOUS at 12:13

## 2022-04-21 RX ADMIN — SODIUM CHLORIDE, POTASSIUM CHLORIDE, SODIUM LACTATE AND CALCIUM CHLORIDE: 600; 310; 30; 20 INJECTION, SOLUTION INTRAVENOUS at 11:52

## 2022-04-21 RX ADMIN — PROPOFOL 50 MG: 10 INJECTION, EMULSION INTRAVENOUS at 11:58

## 2022-04-21 RX ADMIN — PROPOFOL 50 MG: 10 INJECTION, EMULSION INTRAVENOUS at 12:05

## 2022-04-21 RX ADMIN — PROPOFOL 50 MG: 10 INJECTION, EMULSION INTRAVENOUS at 12:04

## 2022-04-21 RX ADMIN — PROPOFOL 100 MG: 10 INJECTION, EMULSION INTRAVENOUS at 11:54

## 2022-04-21 RX ADMIN — PROPOFOL 50 MG: 10 INJECTION, EMULSION INTRAVENOUS at 12:07

## 2022-04-21 RX ADMIN — PROPOFOL 50 MG: 10 INJECTION, EMULSION INTRAVENOUS at 12:01

## 2022-04-21 RX ADMIN — Medication 10 MG: at 12:06

## 2022-04-21 RX ADMIN — PROPOFOL 50 MG: 10 INJECTION, EMULSION INTRAVENOUS at 11:57

## 2022-04-21 RX ADMIN — PROPOFOL 50 MG: 10 INJECTION, EMULSION INTRAVENOUS at 12:15

## 2022-04-21 ASSESSMENT — PULMONARY FUNCTION TESTS
PIF_VALUE: 1
PIF_VALUE: 0
PIF_VALUE: 1

## 2022-04-21 ASSESSMENT — PAIN - FUNCTIONAL ASSESSMENT: PAIN_FUNCTIONAL_ASSESSMENT: 0-10

## 2022-04-21 NOTE — ANESTHESIA PRE PROCEDURE
Department of Anesthesiology  Preprocedure Note       Name:  Brendon Crane   Age:  43 y.o.  :  1980                                          MRN:  [id number]         Date:  2022      Surgeon: Juwan Goyal):  Hal Romberg, MD    Procedure: Procedure(s):  COLONOSCOPY DIAGNOSTIC    Medications prior to admission:   Prior to Admission medications    Medication Sig Start Date End Date Taking? Authorizing Provider   meclizine (ANTIVERT) 25 MG tablet Take 1 tablet by mouth daily as needed for Dizziness or Nausea 22   William Kathleen MD   fluticasone (FLONASE) 50 MCG/ACT nasal spray 1 spray by Nasal route daily 22  William Kathleen MD       Current medications:    No current facility-administered medications for this encounter.        Allergies:  No Known Allergies    Problem List:    Patient Active Problem List   Diagnosis Code    Microcytic anemia D50.9    Class 2 obesity due to excess calories in adult E66.09    Rectal bleeding K62.5    History of ETOH abuse F10.11    Morbid obesity (HCC) E66.01    Hx of ventral hernia repair Z98.890, Z87.19       Past Medical History:        Diagnosis Date    Hyperlipidemia     Hypertension     Vertigo        Past Surgical History:        Procedure Laterality Date    HERNIA REPAIR      VENTRAL HERNIA REPAIR N/A 2021    HERNIA VENTRAL REPAIR WITH MESH performed by Clarice Catalan MD at [address] [place] [address]         Social History:    Social History     Tobacco Use    Smoking status: Never Smoker    Smokeless tobacco: Never Used   Substance Use Topics    Alcohol use: Yes     Comment: Socially                                Counseling given: Not Answered      Vital Signs (Current):   Vitals:    22 1014 22 1017   BP:  (!) 136/90   Pulse:  75   Temp:  97.3 °F (36.3 °C)   SpO2:  95%   Weight: 289 lb (131.1 kg)    Height: 6' 3\" (1.905 m)                                               BP Readings from Last 3 Encounters: 04/21/22 (!) 136/90   02/21/22 132/84   02/15/22 (!) 145/100       NPO Status:                                                                                 BMI:   Wt Readings from Last 3 Encounters:   04/21/22 289 lb (131.1 kg)   02/21/22 283 lb (128.4 kg)   02/15/22 287 lb 14.4 oz (130.6 kg)     Body mass index is 36.12 kg/m². CBC:   Lab Results   Component Value Date    WBC 6.8 02/04/2022    RBC 5.01 02/04/2022    HGB 16.1 02/04/2022    HCT 50.6 02/04/2022    .0 02/04/2022    RDW 14.1 02/04/2022     02/04/2022       CMP:   Lab Results   Component Value Date     05/11/2021    K 4.2 05/11/2021     05/11/2021    CO2 25 05/11/2021    BUN 8 05/11/2021    CREATININE 0.64 05/11/2021    GFRAA >60 05/11/2021    LABGLOM >60 05/11/2021    GLUCOSE 112 05/11/2021    PROT 8.0 05/11/2021    CALCIUM 8.3 05/11/2021    BILITOT 0.71 05/11/2021    ALKPHOS 72 05/11/2021    AST 54 05/11/2021    ALT 26 05/11/2021       POC Tests: No results for input(s): POCGLU, POCNA, POCK, POCCL, POCBUN, POCHEMO, POCHCT in the last 72 hours.     Coags: No results found for: PROTIME, INR, APTT    HCG (If Applicable): No results found for: PREGTESTUR, PREGSERUM, HCG, HCGQUANT     ABGs: No results found for: PHART, PO2ART, NKA6NOZ, XQH7ZOK, BEART, I2TWFYIC     Type & Screen (If Applicable):  No results found for: LABABO, LABRH    Drug/Infectious Status (If Applicable):  No results found for: HIV, HEPCAB    COVID-19 Screening (If Applicable): No results found for: COVID19        Anesthesia Evaluation  Patient summary reviewed and Nursing notes reviewed no history of anesthetic complications:   Airway: Mallampati: II  TM distance: >3 FB   Neck ROM: full  Mouth opening: > = 3 FB Dental: normal exam         Pulmonary:Negative Pulmonary ROS and normal exam  breath sounds clear to auscultation                             Cardiovascular:  Exercise tolerance: good (>4 METS),   (+) hypertension: mild,       ECG reviewed Neuro/Psych:   Negative Neuro/Psych ROS              GI/Hepatic/Renal: Neg GI/Hepatic/Renal ROS            Endo/Other: Negative Endo/Other ROS                    Abdominal:             Vascular: negative vascular ROS. Other Findings:             Anesthesia Plan      MAC     ASA 2       Induction: intravenous. Anesthetic plan and risks discussed with patient. Plan discussed with CRNA.                   Tova Genao MD   4/21/2022

## 2022-04-21 NOTE — DISCHARGE INSTR - DIET

## 2022-04-21 NOTE — H&P
History and Physical Service   [place] [address]    HISTORY AND PHYSICAL EXAMINATION            Date of Evaluation: 4/21/2022  Patient name:  Roddy Vegas  MRN:   [id number]  YOB: 1980  PCP:    Esau Corado MD    History Obtained From:     Patient, medical records     History of Present Illness: This is Roddy Vegas a 43 y.o. male who presents today for a diagnostic colonoscopy  by Dr. Esperanza Boles for anemia and rectal bleeding. The patient has a hx anemia with intermittent rectal bleeding mixed with stools and toilet for past several years. He was referred to GI for consultation and seen on 2/15/22 by Dr Esperanza Boles who recommended additional diagnostic testing. The patient arrived for his procedure  He completed the prep as directed until watery clear. No family history of colon cancer or polyps. No previous colonoscopy. Patient . denies tarry stools, diarrhea alternating with constipation, fever, chills, night sweats, pain or unexplained weight loss. No DM      Past Medical History:     Past Medical History:   Diagnosis Date    Hyperlipidemia     Hypertension     Vertigo         Past Surgical History:     Past Surgical History:   Procedure Laterality Date    HERNIA REPAIR      VENTRAL HERNIA REPAIR N/A 7/6/2021    HERNIA VENTRAL REPAIR WITH MESH performed by Becky Childers MD at [facility]          Medications Prior to Admission:     Prior to Admission medications    Medication Sig Start Date End Date Taking? Authorizing Provider   meclizine (ANTIVERT) 25 MG tablet Take 1 tablet by mouth daily as needed for Dizziness or Nausea 2/21/22   Esau Corado MD   fluticasone (FLONASE) 50 MCG/ACT nasal spray 1 spray by Nasal route daily 2/21/22 2/21/23  Esau Corado MD        Allergies:     Patient has no known allergies. Social History:     Tobacco:    reports that he has never smoked.  He has never used smokeless tobacco.  Alcohol: reports current alcohol use. Drug Use:  reports no history of drug use. Family History:     History reviewed. No pertinent family history. Review of Systems:     Positive and Negative as described in HPI. CONSTITUTIONAL:  negative for fevers, chills, sweats, fatigue, weight loss  HEENT:  negative for vision, hearing changes, runny nose, throat pain  RESPIRATORY:  Uses Flonase daily for congestion negative for shortness of breath, cough, congestion, wheezing. CARDIOVASCULAR: Hx HTN and HLD \"years ago and does not take any medication  negative for chest pain, palpitations. GASTROINTESTINAL:  See HPI   GENITOURINARY:  negative for difficulty of urination, burning with urination, frequency   INTEGUMENT:  negative for rash, skin lesions, easy bruising   HEMATOLOGIC/LYMPHATIC:  negative for swelling/edema   ALLERGIC/IMMUNOLOGIC:  negative for urticaria , itching  ENDOCRINE:  negative increase in drinking, increase in urination, hot or cold intolerance  MUSCULOSKELETAL:  negative joint pains, muscle aches, swelling of joints  NEUROLOGICAL:  Vertigo on Antivert prn negative for headaches, dizziness, lightheadedness, numbness, pain, tingling extremities  BEHAVIOR/PSYCH:  negative for depression, anxiety    Physical Exam:   BP (!) 136/90   Pulse 75   Temp 97.3 °F (36.3 °C)   Resp 16   Ht 6' 3\" (1.905 m)   Wt 289 lb (131.1 kg)   SpO2 95%   BMI 36.12 kg/m²     General Appearance: obese male  alert, well appearing, and in no acute distress  Mental status: oriented to person, place, and time with normal affect  Head:  normocephalic, atraumatic.   Eye: no icterus, redness, pupils equal and reactive, extraocular eye movements intact, conjunctiva clear  Ear: normal external ear, no discharge, hearing intact  Nose:  no drainage noted  Mouth: decreased visible airway  mucous membranes moist  Neck: thick neck,  no carotid bruits, thyroid not palpable  Lungs: Bilateral equal air entry, clear to ausculation, no wheezing, rales or rhonchi, normal effort No CVA tenderness  Cardiovascular: /90  HR 75 normal rate, regular rhythm, no murmur, gallop, rub. Abdomen: obese, round, soft, nontender, nondistended, normal bowel sounds,  Neurologic: There are no new focal motor or sensory deficits, normal muscle tone and bulk, no abnormal sensation, normal speech, cranial nerves II through XII grossly intact  Skin: No gross lesions, rashes, bruising or bleeding on exposed skin area  Extremities:  peripheral pulses palpable, no pedal edema or calf pain with palpation  Psych: normal affect     Investigations:      Laboratory Testing:  No results for input(s): COVID19 in the last 720 hours. No results for input(s): POCGLU in the last 72 hours. No results found for this or any previous visit (from the past 24 hour(s)). No results for input(s): HGB, HCT, WBC, MCV, PLATELET, NA, K, CL, CO2, BUN, CREATININE, GLUCOSE, INR, PROTIME, APTT, AST, ALT, LABALBU, HCG in the last 720 hours. Imaging/Diagnostics:    Diagnosis:      1. Anemia and rectal bleeding    Plans:     1.  Diagnostic colonoscopy      TARA Tompkins CNP  4/21/2022  11:17 AM

## 2022-04-21 NOTE — ANESTHESIA POSTPROCEDURE EVALUATION
Department of Anesthesiology  Postprocedure Note    Patient: Noah Day  MRN: [id number]  YOB: 1980  Date of evaluation: 4/21/2022  Time:  12:51 PM     Procedure Summary     Date: 04/21/22 Room / Location: [facility] [place] [address] / [facility] - INPATIENT    Anesthesia Start: 1152 Anesthesia Stop: 1226    Procedure: COLONOSCOPY WITH BIOPSY (N/A ) Diagnosis: (DX ANEMIA, RECTAL BLEEDING)    Surgeons: Suhail Lemons MD Responsible Provider: Cecelia Closs, MD    Anesthesia Type: general ASA Status: 2          Anesthesia Type: general    Ruth Phase I:      Ruth Phase II: Ruth Score: 9    Last vitals: Reviewed and per EMR flowsheets.        Anesthesia Post Evaluation    Patient location during evaluation: PACU  Patient participation: complete - patient participated  Level of consciousness: awake  Airway patency: patent  Nausea & Vomiting: no vomiting and no nausea  Complications: no  Cardiovascular status: hemodynamically stable  Respiratory status: acceptable  Hydration status: stable

## 2022-04-21 NOTE — DISCHARGE INSTR - COC
Continuity of Care Form    Patient Name: Ford Linares   :  1980  MRN:  [id number]    Admit date:  2022  Discharge date:  ***    Code Status Order: No Order   Advance Directives:   [facility] Documentation       Date/Time Healthcare Directive Type of Healthcare Directive Copy in [facility] [address] Agent's Name Healthcare Agent's Phone Number    22 1029 No, patient does not have an advance directive for healthcare treatment -- -- -- -- --            Admitting Physician:  Anabell Alfaro MD  PCP: Latricia Pennington MD    Discharging Nurse: [facility] Unit/Room#: [facility]  Discharging Unit Phone Number: ***    Emergency Contact:   Extended Emergency Contact Information  Primary Emergency Contact: Deyanira Jang   [address] Phone: [phone]  Relation: Spouse    Past Surgical History:  Past Surgical History:   Procedure Laterality Date    [address] [facility] N/A 2021    HERNIA VENTRAL REPAIR WITH MESH performed by Laxmi Cooney MD at [address] EXTRACTION         Immunization History:   Immunization History   Administered Date(s) Administered    COVID-19, J&J, PF, 0.5 mL 2021    DT (pediatric) 1998    Tdap (Boostrix, Adacel) 2013       Active Problems:  Patient Active Problem List   Diagnosis Code    Microcytic anemia D50.9    Class 2 obesity due to excess calories in adult E66.09    Rectal bleeding K62.5    History of ETOH abuse F10.11    Morbid obesity ([facility] [address] E66.01    Hx of ventral hernia repair Z98.890, Z87.19       Isolation/Infection:   Isolation            No Isolation          Patient Infection Status       None to display            Nurse Assessment:  Last Vital Signs: /84   Pulse 87   Temp 97.7 °F (36.5 °C) (Temporal)   Resp 17   Ht 6' 3\" (1.905 m)   Wt 289 lb (131.1 kg)   SpO2 95%   BMI 36.12 kg/m²     Last documented pain score (0-10 scale): Pain Level: 0  Last Weight:   Wt Readings from Last 1 Encounters:   22 289 lb (131.1 kg)     Mental Status:  {IP PT MENTAL STATUS:58768}    IV Access:  { SHANNON IV ACCESS:605346297}    Nursing Mobility/ADLs:  Walking   {CHP DME JXZV:522204265}  Transfer  {CHP DME NRWU:747257458}  Bathing  {CHP DME EATQ:408937364}  Dressing  {CHP DME ZXKD:764220914}  Toileting  {CHP DME YWBR:824170018}  Feeding  {CHP DME VIBU:007569744}  Med Admin  {P DME BWPC:415035960}  Med Delivery   { SHANNON MED Delivery:373435796}    Wound Care Documentation and Therapy:        Elimination:  Continence: Bowel: {YES / SF:72498}  Bladder: {YES / J}  Urinary Catheter: {Urinary Catheter:791557804}   Colostomy/Ileostomy/Ileal Conduit: {YES / FL:69390}       Date of Last BM: ***  No intake or output data in the 24 hours ending 22 1250  No intake/output data recorded.     Safety Concerns:     [address] [email or web address] Safety Concerns:269581590}    Impairments/Disabilities:      [address] [email or web address] Impairments/Disabilities:207987832}    Nutrition Therapy:  Current Nutrition Therapy:   [address] [email or web address] Diet List:236338896}    Routes of Feeding: {CHP DME Other Feedings:212177721}  Liquids: {Slp liquid thickness:27492}  Daily Fluid Restriction: {CHP DME Yes amt example:349419789}  Last Modified Barium Swallow with Video (Video Swallowing Test): {Done Not Done TB:492227392}    Treatments at the Time of Hospital Discharge:   Respiratory Treatments: ***  Oxygen Therapy:  {Therapy; copd oxygen:35254}  Ventilator:    {[facility] Vent KAA}    Rehab Therapies: {THERAPEUTIC INTERVENTION:2939621658}  Weight Bearing Status/Restrictions: [employer] Weight Bearin}  Other Medical Equipment (for information only, NOT a DME order):  {EQUIPMENT:867364025}  Other Treatments: ***    Patient's personal belongings (please select all that are sent with patient):  {ALDO DME Belongings:597360370}    RN SIGNATURE:  {Esignature:835414077}    CASE MANAGEMENT/SOCIAL WORK SECTION    Inpatient Status Date: ***    Readmission Risk Assessment Score:  Readmission Risk              Risk of Unplanned Readmission:  0           Discharging to Facility/ Agency   Name:   Address:  Phone:  Fax:    Dialysis Facility (if applicable)   Name:  Address:  Dialysis Schedule:  Phone:  Fax:    / signature: {Esignature:776737933}    PHYSICIAN SECTION    Prognosis: {Prognosis:9448047318}    Condition at Discharge: [facility] Patient Condition:161368966}    Rehab Potential (if transferring to Rehab): {Prognosis:3965518672}    Recommended Labs or Other Treatments After Discharge: ***    Physician Certification: I certify the above information and transfer of Mina Holden  is necessary for the continuing treatment of the diagnosis listed and that he requires {Admit to Appropriate Level of Care:12056} for {GREATER/LESS:452522986} 30 days.      Update Admission H&P: {CHP DME Changes in THWPY:970398413}    PHYSICIAN SIGNATURE:  {Esignature:818653783}

## 2022-04-21 NOTE — OP NOTE
SATURATIONS AND WAS DISCHARGED HOME WITH RIDE IN A STABLE CONDITION.     Electronically signed by Shann Spurling, MD  on 4/21/2022 at 12:20 PM

## 2022-04-22 LAB — SURGICAL PATHOLOGY REPORT: NORMAL

## 2022-05-04 DIAGNOSIS — D50.8 OTHER IRON DEFICIENCY ANEMIA: ICD-10-CM

## 2022-05-04 DIAGNOSIS — K62.5 RECTAL BLEEDING: ICD-10-CM

## 2022-07-19 ENCOUNTER — TELEPHONE (OUTPATIENT)
Dept: INTERNAL MEDICINE CLINIC | Age: 42
End: 2022-07-19

## 2022-08-31 ENCOUNTER — TELEPHONE (OUTPATIENT)
Dept: INTERNAL MEDICINE CLINIC | Age: 42
End: 2022-08-31

## 2022-08-31 NOTE — TELEPHONE ENCOUNTER
Pt called stating that his BP have been going up and down, have been checking them at work was 135/90 and wanted to see if he could get something called in for it, made an appt for 9/7/22 to get evaluated.

## 2022-09-01 NOTE — TELEPHONE ENCOUNTER
Needs to see virtual or in person with log to make any changes in meds  One or two reading is not sufficient

## 2022-09-07 ENCOUNTER — OFFICE VISIT (OUTPATIENT)
Dept: INTERNAL MEDICINE CLINIC | Age: 42
End: 2022-09-07
Payer: COMMERCIAL

## 2022-09-07 VITALS
DIASTOLIC BLOOD PRESSURE: 88 MMHG | HEART RATE: 84 BPM | OXYGEN SATURATION: 96 % | SYSTOLIC BLOOD PRESSURE: 136 MMHG | WEIGHT: 296 LBS | BODY MASS INDEX: 37 KG/M2

## 2022-09-07 DIAGNOSIS — Z00.00 HEALTH CARE MAINTENANCE: ICD-10-CM

## 2022-09-07 DIAGNOSIS — L91.8 SKIN TAG: Primary | ICD-10-CM

## 2022-09-07 DIAGNOSIS — E66.9 CLASS 2 OBESITY WITH BODY MASS INDEX (BMI) OF 36.0 TO 36.9 IN ADULT, UNSPECIFIED OBESITY TYPE, UNSPECIFIED WHETHER SERIOUS COMORBIDITY PRESENT: ICD-10-CM

## 2022-09-07 PROCEDURE — 99213 OFFICE O/P EST LOW 20 MIN: CPT | Performed by: INTERNAL MEDICINE

## 2022-09-07 SDOH — ECONOMIC STABILITY: FOOD INSECURITY: WITHIN THE PAST 12 MONTHS, THE FOOD YOU BOUGHT JUST DIDN'T LAST AND YOU DIDN'T HAVE MONEY TO GET MORE.: NEVER TRUE

## 2022-09-07 SDOH — ECONOMIC STABILITY: FOOD INSECURITY: WITHIN THE PAST 12 MONTHS, YOU WORRIED THAT YOUR FOOD WOULD RUN OUT BEFORE YOU GOT MONEY TO BUY MORE.: NEVER TRUE

## 2022-09-07 SDOH — ECONOMIC STABILITY: TRANSPORTATION INSECURITY
IN THE PAST 12 MONTHS, HAS THE LACK OF TRANSPORTATION KEPT YOU FROM MEDICAL APPOINTMENTS OR FROM GETTING MEDICATIONS?: NO

## 2022-09-07 SDOH — ECONOMIC STABILITY: TRANSPORTATION INSECURITY
IN THE PAST 12 MONTHS, HAS LACK OF TRANSPORTATION KEPT YOU FROM MEETINGS, WORK, OR FROM GETTING THINGS NEEDED FOR DAILY LIVING?: NO

## 2022-09-07 ASSESSMENT — LIFESTYLE VARIABLES
HOW OFTEN DO YOU HAVE A DRINK CONTAINING ALCOHOL: 4 OR MORE TIMES A WEEK
HOW MANY STANDARD DRINKS CONTAINING ALCOHOL DO YOU HAVE ON A TYPICAL DAY: 3 OR 4

## 2022-09-07 ASSESSMENT — SOCIAL DETERMINANTS OF HEALTH (SDOH): HOW HARD IS IT FOR YOU TO PAY FOR THE VERY BASICS LIKE FOOD, HOUSING, MEDICAL CARE, AND HEATING?: NOT HARD AT ALL

## 2022-09-07 ASSESSMENT — PATIENT HEALTH QUESTIONNAIRE - PHQ9
SUM OF ALL RESPONSES TO PHQ QUESTIONS 1-9: 0
2. FEELING DOWN, DEPRESSED OR HOPELESS: 0
SUM OF ALL RESPONSES TO PHQ QUESTIONS 1-9: 0
1. LITTLE INTEREST OR PLEASURE IN DOING THINGS: 0
SUM OF ALL RESPONSES TO PHQ9 QUESTIONS 1 & 2: 0
SUM OF ALL RESPONSES TO PHQ QUESTIONS 1-9: 0
SUM OF ALL RESPONSES TO PHQ QUESTIONS 1-9: 0

## 2022-09-07 NOTE — PROGRESS NOTES
[address]  [place] [address]  Dept: [phone]  Dept Fax: [phone]     Name: Jody Kelly  : 1980           Chief Complaint   Patient presents with    Hypertension     Elevated blood pressure readings at work with the wrist cuff, asymptomatic other than vertigo monthly or less. History of Present Illness:    Patient came today for follow-up, patient states that he checks his blood pressure at work and its in 150s 140s, patient asymptomatic, denies any headache blurred vision, no dizzy spells , multiple blood pressure pressure readings in office over the last year normal,  Patient was instructed about the right technique of taking the blood pressure , including using appropriate size blood pressure cuff  Patient has multiple skin tags on the neck, wants to see dermatology  Patient came today for follow-up, had colonoscopy done in 2021, found to have polyp, biopsy consistent with tubular adenoma.     Patient does not want HIV or hepatitis C screening  Reviewed health maintenance    Continues to drink 4-5 beers 2-3 times a week, recommended cutting down on alcohol intake        Past Medical History:    Past Medical History:   Diagnosis Date    Hyperlipidemia     Hypertension     Vertigo       Reviewed all health maintenance requirements and ordered appropriate tests  Health Maintenance Due   Topic Date Due    Varicella vaccine (1 of 2 - 2-dose childhood series) Never done    HIV screen  Never done    Hepatitis C screen  Never done    COVID-19 Vaccine (2 - Booster for Josue series) 2021    Depression Screen  2022    Flu vaccine (1) Never done       Past Surgical History:    Past Surgical History:   Procedure Laterality Date    COLONOSCOPY N/A 2022    COLONOSCOPY WITH BIOPSY [facility] Kerry [facility]i MD [facility] N/A 2021    HERNIA VENTRAL REPAIR WITH MESH performed by appears to be intact  Nose - no drainage noted  Mouth - mucous membranes moist  Neck - supple, no carotid bruits, thyroid not palpable  Chest - clear to auscultation, normal effort  Heart - normal rate, regular rhythm, no murmurs  Abdomen - soft, nontender, nondistended, bowel sounds present all four quadrants, no masses, hepatomegaly or splenomegaly  Neurological - normal speech, no focal findings or movement disorder noted, cranial nerves II through XII grossly intact  Extremities - peripheral pulses palpable, no pedal edema or calf pain with palpation  Skin -multiple  skin tags present      Data:    Lab Results   Component Value Date/Time     05/11/2021 01:53 PM    K 4.2 05/11/2021 01:53 PM     05/11/2021 01:53 PM    CO2 25 05/11/2021 01:53 PM    BUN 8 05/11/2021 01:53 PM    CREATININE 0.64 05/11/2021 01:53 PM    GLUCOSE 112 05/11/2021 01:53 PM    PROT 8.0 05/11/2021 01:53 PM    LABALBU 3.6 05/11/2021 01:53 PM    BILITOT 0.71 05/11/2021 01:53 PM    ALKPHOS 72 05/11/2021 01:53 PM    AST 54 05/11/2021 01:53 PM    ALT 26 05/11/2021 01:53 PM     Lab Results   Component Value Date/Time    WBC 6.8 02/04/2022 11:16 AM    RBC 5.01 02/04/2022 11:16 AM    HGB 16.1 02/04/2022 11:16 AM    HCT 50.6 02/04/2022 11:16 AM    .0 02/04/2022 11:16 AM    MCH 32.1 02/04/2022 11:16 AM    MCHC 31.8 02/04/2022 11:16 AM    RDW 14.1 02/04/2022 11:16 AM     02/04/2022 11:16 AM    MPV 10.6 02/04/2022 11:16 AM     Lab Results   Component Value Date/Time    TSH 1.51 06/22/2018 07:31 AM     Lab Results   Component Value Date/Time    HDL 38 06/22/2018 07:31 AM          Assessment & Plan:     Diagnosis Orders   1. Skin tag  Ambulatory referral to Dermatology      2. Health care maintenance  Basic Metabolic Panel    CBC with Auto Differential      3. Class 2 obesity with body mass index (BMI) of 36.0 to 36.9 in adult, unspecified obesity type, unspecified whether serious comorbidity present            1.  Skin tag  - Ambulatory referral to Dermatology  2. Health care maintenance  -     Basic Metabolic Panel; Future  -     CBC with Auto Differential; Future  3. Class 2 obesity with body mass index (BMI) of 36.0 to 36.9 in adult, unspecified obesity type, unspecified whether serious comorbidity present         Patient recommended about weight loss  Instructions printed and given  Recommended cut down on alcohol intake patient voiced understanding  Routine labs ordered  Dermatology referral given for skin tag  Patient instructed about the right technique to check the blood pressure  Patient advised to check blood pressure and make a log, bring log to the office next time  Patient to call office if blood pressure is consistently staying high at home        Completed Refills   Requested Prescriptions      No prescriptions requested or ordered in this encounter     Return in about 6 months (around 3/7/2023), or if symptoms worsen or fail to improve, for htn . No orders of the defined types were placed in this encounter.     Orders Placed This Encounter   Procedures    Basic Metabolic Panel     Standing Status:   Future     Standing Expiration Date:   9/7/2023    CBC with Auto Differential     Standing Status:   Future     Standing Expiration Date:   9/7/2023    Ambulatory referral to Dermatology     Referral Priority:   Routine     Referral Type:   Consult for Advice and Opinion     Requested Specialty:   Dermatology     Number of Visits Requested:   1       Electronically signed by Lisette Hay MD on 9/7/2022 at 2:23 PM

## 2022-09-15 ENCOUNTER — TELEPHONE (OUTPATIENT)
Dept: INTERNAL MEDICINE CLINIC | Age: 42
End: 2022-09-15

## 2022-09-15 DIAGNOSIS — L91.8 SKIN TAG: Primary | ICD-10-CM

## 2022-09-15 NOTE — TELEPHONE ENCOUNTER
Patient had been referred to Dermatology at his last appointment refer put in under ambulatory  and patient has chosen to see Dr Ray Rollins , please sign referral request.      Dr Ray Rollins office called to inform that they do not specialize  in skin tag removal it would be considered cosmetic. Contacted  Dr Kelsi Mccrary in regards to skin tag removal, also as well informed that this is considered cosmetic only and insurance will not cover.     Contacted patient and informed cosmetic only for skin tag referrals , he informed that he will just hold off for right now and maybe try home remedies

## 2024-03-23 ENCOUNTER — APPOINTMENT (OUTPATIENT)
Dept: GENERAL RADIOLOGY | Age: 44
End: 2024-03-23
Payer: COMMERCIAL

## 2024-03-23 ENCOUNTER — HOSPITAL ENCOUNTER (EMERGENCY)
Age: 44
Discharge: HOME | End: 2024-03-23
Attending: EMERGENCY MEDICINE
Payer: COMMERCIAL

## 2024-03-23 VITALS
TEMPERATURE: 98.6 F | OXYGEN SATURATION: 96 % | WEIGHT: 295 LBS | RESPIRATION RATE: 18 BRPM | HEART RATE: 78 BPM | HEIGHT: 75 IN | DIASTOLIC BLOOD PRESSURE: 90 MMHG | BODY MASS INDEX: 36.68 KG/M2 | SYSTOLIC BLOOD PRESSURE: 157 MMHG

## 2024-03-23 DIAGNOSIS — J40 BRONCHITIS: Primary | ICD-10-CM

## 2024-03-23 LAB
ALBUMIN SERPL-MCNC: 2.9 G/DL (ref 3.5–5.2)
ALBUMIN/GLOB SERPL: 1 {RATIO} (ref 1–2.5)
ALP SERPL-CCNC: 128 U/L (ref 40–129)
ALT SERPL-CCNC: 26 U/L (ref 10–50)
ANION GAP SERPL CALCULATED.3IONS-SCNC: 10 MMOL/L (ref 9–16)
AST SERPL-CCNC: 96 U/L (ref 10–50)
BASOPHILS # BLD: 0.05 K/UL (ref 0–0.2)
BASOPHILS NFR BLD: 1 % (ref 0–2)
BILIRUB SERPL-MCNC: 4.8 MG/DL (ref 0–1.2)
BUN SERPL-MCNC: 4 MG/DL (ref 6–20)
CALCIUM SERPL-MCNC: 8 MG/DL (ref 8.6–10.4)
CHLORIDE SERPL-SCNC: 100 MMOL/L (ref 98–107)
CO2 SERPL-SCNC: 23 MMOL/L (ref 20–31)
CREAT SERPL-MCNC: 0.6 MG/DL (ref 0.7–1.2)
EKG ATRIAL RATE: 77 BPM
EKG P AXIS: 50 DEGREES
EKG P-R INTERVAL: 164 MS
EKG Q-T INTERVAL: 420 MS
EKG QRS DURATION: 92 MS
EKG QTC CALCULATION (BAZETT): 475 MS
EKG R AXIS: 64 DEGREES
EKG T AXIS: 45 DEGREES
EKG VENTRICULAR RATE: 77 BPM
EOSINOPHIL # BLD: 0.18 K/UL (ref 0–0.44)
EOSINOPHILS RELATIVE PERCENT: 3 % (ref 1–4)
ERYTHROCYTE [DISTWIDTH] IN BLOOD BY AUTOMATED COUNT: 16.7 % (ref 11.8–14.4)
GFR SERPL CREATININE-BSD FRML MDRD: >60 ML/MIN/1.73M2
GLUCOSE SERPL-MCNC: 136 MG/DL (ref 74–99)
HCT VFR BLD AUTO: 39.4 % (ref 40.7–50.3)
HGB BLD-MCNC: 13.5 G/DL (ref 13–17)
IMM GRANULOCYTES # BLD AUTO: <0.03 K/UL (ref 0–0.3)
IMM GRANULOCYTES NFR BLD: 0 %
LYMPHOCYTES NFR BLD: 1.21 K/UL (ref 1.1–3.7)
LYMPHOCYTES RELATIVE PERCENT: 18 % (ref 24–43)
MCH RBC QN AUTO: 35.7 PG (ref 25.2–33.5)
MCHC RBC AUTO-ENTMCNC: 34.3 G/DL (ref 28.4–34.8)
MCV RBC AUTO: 104.2 FL (ref 82.6–102.9)
MONOCYTES NFR BLD: 0.59 K/UL (ref 0.1–1.2)
MONOCYTES NFR BLD: 9 % (ref 3–12)
NEUTROPHILS NFR BLD: 70 % (ref 36–65)
NEUTS SEG NFR BLD: 4.85 K/UL (ref 1.5–8.1)
NRBC BLD-RTO: 0 PER 100 WBC
PLATELET # BLD AUTO: ABNORMAL K/UL (ref 138–453)
PLATELET, FLUORESCENCE: ABNORMAL K/UL (ref 138–453)
POTASSIUM SERPL-SCNC: 3.8 MMOL/L (ref 3.7–5.3)
PROT SERPL-MCNC: 8.6 G/DL (ref 6.6–8.7)
RBC # BLD AUTO: 3.78 M/UL (ref 4.21–5.77)
RBC # BLD: ABNORMAL 10*6/UL
RBC # BLD: ABNORMAL 10*6/UL
SODIUM SERPL-SCNC: 133 MMOL/L (ref 136–145)
TROPONIN I SERPL HS-MCNC: 6 NG/L (ref 0–22)
TSH SERPL DL<=0.05 MIU/L-ACNC: 1.21 UIU/ML (ref 0.27–4.2)
WBC OTHER # BLD: 6.9 K/UL (ref 3.5–11.3)

## 2024-03-23 PROCEDURE — 86308 HETEROPHILE ANTIBODY SCREEN: CPT

## 2024-03-23 PROCEDURE — 84484 ASSAY OF TROPONIN QUANT: CPT

## 2024-03-23 PROCEDURE — 80053 COMPREHEN METABOLIC PANEL: CPT

## 2024-03-23 PROCEDURE — 93005 ELECTROCARDIOGRAM TRACING: CPT | Performed by: STUDENT IN AN ORGANIZED HEALTH CARE EDUCATION/TRAINING PROGRAM

## 2024-03-23 PROCEDURE — 71046 X-RAY EXAM CHEST 2 VIEWS: CPT

## 2024-03-23 PROCEDURE — 85025 COMPLETE CBC W/AUTO DIFF WBC: CPT

## 2024-03-23 PROCEDURE — 6370000000 HC RX 637 (ALT 250 FOR IP): Performed by: STUDENT IN AN ORGANIZED HEALTH CARE EDUCATION/TRAINING PROGRAM

## 2024-03-23 PROCEDURE — 99285 EMERGENCY DEPT VISIT HI MDM: CPT

## 2024-03-23 PROCEDURE — 6370000000 HC RX 637 (ALT 250 FOR IP)

## 2024-03-23 PROCEDURE — 85055 RETICULATED PLATELET ASSAY: CPT

## 2024-03-23 PROCEDURE — 84443 ASSAY THYROID STIM HORMONE: CPT

## 2024-03-23 RX ORDER — BENZONATATE 100 MG/1
100 CAPSULE ORAL 3 TIMES DAILY PRN
Status: DISCONTINUED | OUTPATIENT
Start: 2024-03-23 | End: 2024-03-24 | Stop reason: HOSPADM

## 2024-03-23 RX ORDER — AMOXICILLIN AND CLAVULANATE POTASSIUM 875; 125 MG/1; MG/1
1 TABLET, FILM COATED ORAL ONCE
Status: COMPLETED | OUTPATIENT
Start: 2024-03-23 | End: 2024-03-23

## 2024-03-23 RX ORDER — AMOXICILLIN AND CLAVULANATE POTASSIUM 875; 125 MG/1; MG/1
1 TABLET, FILM COATED ORAL 2 TIMES DAILY
Qty: 10 TABLET | Refills: 0 | Status: SHIPPED | OUTPATIENT
Start: 2024-03-23 | End: 2024-03-28

## 2024-03-23 RX ORDER — 0.9 % SODIUM CHLORIDE 0.9 %
1000 INTRAVENOUS SOLUTION INTRAVENOUS ONCE
Status: DISCONTINUED | OUTPATIENT
Start: 2024-03-23 | End: 2024-03-24 | Stop reason: HOSPADM

## 2024-03-23 RX ADMIN — BENZONATATE 100 MG: 100 CAPSULE ORAL at 19:10

## 2024-03-23 RX ADMIN — AMOXICILLIN AND CLAVULANATE POTASSIUM 1 TABLET: 875; 125 TABLET, FILM COATED ORAL at 22:12

## 2024-03-23 ASSESSMENT — ENCOUNTER SYMPTOMS
ABDOMINAL DISTENTION: 0
VOMITING: 0
NAUSEA: 0
ABDOMINAL PAIN: 0
TROUBLE SWALLOWING: 0
FACIAL SWELLING: 0
SHORTNESS OF BREATH: 0
BACK PAIN: 0
CHEST TIGHTNESS: 0
COUGH: 1

## 2024-03-23 ASSESSMENT — PAIN DESCRIPTION - DESCRIPTORS: DESCRIPTORS: ACHING

## 2024-03-23 ASSESSMENT — LIFESTYLE VARIABLES
HOW OFTEN DO YOU HAVE A DRINK CONTAINING ALCOHOL: NEVER
HOW MANY STANDARD DRINKS CONTAINING ALCOHOL DO YOU HAVE ON A TYPICAL DAY: 5 OR 6
HOW OFTEN DO YOU HAVE A DRINK CONTAINING ALCOHOL: 4 OR MORE TIMES A WEEK

## 2024-03-23 ASSESSMENT — PAIN - FUNCTIONAL ASSESSMENT: PAIN_FUNCTIONAL_ASSESSMENT: 0-10

## 2024-03-23 ASSESSMENT — PAIN SCALES - GENERAL: PAINLEVEL_OUTOF10: 3

## 2024-03-23 ASSESSMENT — PAIN DESCRIPTION - LOCATION: LOCATION: HEAD

## 2024-03-23 NOTE — ED TRIAGE NOTES
Pt came to the ED for cough, congestion, and fatigue for the last 6 weeks. Patient decided to come to the ED because fatigue was getting worse. Denies shortness of breath. Pt took at home covid test and results were negative.

## 2024-03-23 NOTE — ED NOTES
Report received from MERE Vargas.   Pt. Resting on stretcher, eyes open, RR even and non-labored  Pt. Updated on POC.  Will continue to monitor.

## 2024-03-23 NOTE — ED PROVIDER NOTES
Note Started: 7:04 PM EDT         [facility]     Emergency Department     Faculty Attestation    I performed a history and physical examination of the patient and discussed management with the resident. I reviewed the resident’s note and agree with the documented findings and plan of care. Any areas of disagreement are noted on the chart. I was personally present for the key portions of any procedures. I have documented in the chart those procedures where I was not present during the key portions. I have reviewed the emergency nurses triage note. I agree with the chief complaint, past medical history, past surgical history, allergies, medications, social and family history as documented unless otherwise noted below.        For Physician Assistant/ Nurse Practitioner cases/documentation I have personally evaluated this patient and have completed at least one if not all key elements of the E/M (history, physical exam, and MDM). Additional findings are as noted.  I have personally seen and evaluated the patient.  I find the patient's history and physical exam are consistent with the NP/PA documentation.  I agree with the care provided, treatment rendered, disposition and follow-up plan.    43-year-old male presenting with 5 weeks of cough, congestion, voice raspiness.  Started with gastrointestinal symptoms including diarrhea and fever.  Also had mild upper respiratory symptoms at that time.  Got better, then started getting worse including voice raspiness, cough with sputum production.  Symptoms have not fully gone away.  Does have some postnasal drip.  No fever recently.  Multiple sick contacts at work and at home.    Exam:  General : Laying on the bed, awake, alert, and in no acute distress  CV : normal rate and regular rhythm  Lungs : Breathing comfortably on room air with no tachypnea, hypoxia, or increased work of breathing  HEENT: Uvula midline, no tonsillar exudates.  Trachea midline.  No  palpable thyromegaly    DDx: Viral URI, postnasal drip, mononucleosis, atypical pneumonia    Plan:  Chest x-ray, CBC, electrolytes, thyroid studies, Monospot      Medical Decision Making  Amount and/or Complexity of Data Reviewed  Labs: ordered.  Radiology: ordered.  ECG/medicine tests: ordered.    Risk  Prescription drug management.          Jesusita Ruiz MD   Attending Emergency Physician    (Please note that portions of this note were completed with a voice recognition program. Efforts were made to edit the dictations but occasionally words are mis-transcribed.)            Jesusita Ruiz MD  03/23/24 [id number]

## 2024-03-23 NOTE — ED PROVIDER NOTES
[facility] ED  Emergency Department Encounter  Emergency Medicine Resident     Pt Name:Arturo Jang  MRN: [id number]  Birthdate 1980  Date of evaluation: 3/23/24  PCP:  Russ Snowden MD  Note Started: 7:22 PM EDT      CHIEF COMPLAINT       Chief Complaint   Patient presents with    Cough    Fatigue       HISTORY OF PRESENT ILLNESS  (Location/Symptom, Timing/Onset, Context/Setting, Quality, Duration, Modifying Factors, Severity.)      Arturo Jang is a 43 y.o. male who presents emergency department with 5 to 6 weeks of cough, congestion and fatigue.  Patient states over the last 24 to 48 hours he has had increased fatigue to the point where it made him concerned enough to come to the ER.  He also reports that multiple people at work have been sick and he feels as though it is just constantly going through his workplace.  Denies any abdominal pain, nausea or vomiting.  Does report productive cough    PAST MEDICAL / SURGICAL / SOCIAL / FAMILY HISTORY      has a past medical history of Hyperlipidemia, Hypertension, and Vertigo.       has a past surgical history that includes [place] tooth extraction; ventral hernia repair (N/A, 7/6/2021); hernia repair; and Colonoscopy (N/A, 4/21/2022).      Social History     Socioeconomic History    Marital status:      Spouse name: Not on file    Number of children: Not on file    Years of education: Not on file    Highest education level: Not on file   Occupational History    Not on file   Tobacco Use    Smoking status: Never    Smokeless tobacco: Never   Vaping Use    Vaping Use: Never used   Substance and Sexual Activity    Alcohol use: Yes     Comment: Socially    Drug use: Never    Sexual activity: Yes     Partners: Female   Other Topics Concern    Not on file   Social History Narrative    Not on file     Social Determinants of Health     Financial Resource Strain: Low Risk  (9/7/2022)    Overall Financial Resource Strain (CARDIA)      Difficulty of Paying Living Expenses: Not hard at all   Food Insecurity: No Food Insecurity (9/7/2022)    Hunger Vital Sign     Worried About Running Out of Food in the Last Year: Never true     Ran Out of Food in the Last Year: Never true   Transportation Needs: No Transportation Needs (9/7/2022)    PRAPARE - Transportation     Lack of Transportation (Medical): No     Lack of Transportation (Non-Medical): No   Physical Activity: Not on file   Stress: Not on file   Social Connections: Not on file   Intimate Partner Violence: Not on file   Housing Stability: Not on file       No family history on file.    Allergies:  Patient has no known allergies.    Home Medications:  Prior to Admission medications    Medication Sig Start Date End Date Taking? Authorizing Provider   meclizine (ANTIVERT) 25 MG tablet Take 1 tablet by mouth daily as needed for Dizziness or Nausea  Patient taking differently: Take 1 tablet by mouth daily as needed for Dizziness or Nausea PRN 2/21/22   Russ Snowden MD   fluticasone (FLONASE) 50 MCG/ACT nasal spray 1 spray by Nasal route daily  Patient not taking: Reported on 9/7/2022 2/21/22 2/21/23  Russ Snowden MD         REVIEW OF SYSTEMS       Review of Systems   Constitutional:  Positive for activity change, chills, fatigue and fever. Negative for appetite change.   HENT:  Positive for congestion. Negative for facial swelling and trouble swallowing.    Eyes:  Negative for visual disturbance.   Respiratory:  Positive for cough. Negative for chest tightness and shortness of breath.    Cardiovascular:  Negative for chest pain.   Gastrointestinal:  Negative for abdominal distention, abdominal pain, nausea and vomiting.   Musculoskeletal:  Positive for myalgias. Negative for back pain and neck stiffness.   Neurological:  Negative for headaches.       PHYSICAL EXAM      INITIAL VITALS:   BP (!) 157/90   Pulse 78   Temp 98.6 °F (37 °C) (Oral)   Resp 18   Ht 1.905 m (6' 3\")   Wt 133.8 kg

## 2024-03-24 LAB — HETEROPH AB BLD QL IA: NEGATIVE

## 2024-03-24 NOTE — ED PROVIDER NOTES
This patient was signed out to me by Dr. Batista at the completion of her shift.  Patient presented extensively with a 5-week history of cough, congestion, hoarse voice.  He initially experienced diarrhea and subjective fever both of which apparently resolved.  Patient is also complaining of severe malaise.  I reviewed the patient's lab results.  We are currently awaiting the results of the mononucleosis screen.  Subsequent to the return of that screen patient will likely be discharged with instructions to follow-up with her primary care provider  at the next valve opportunity.     Michael Villarreal MD  03/23/24 [id number]

## 2024-03-24 NOTE — ED PROVIDER NOTES
[facility] ED  Emergency Department  Emergency Medicine Sign-out   Care of Arturo Jang was assumed from Dr. Ardon and is being seen for Cough and Fatigue  .  The patient's initial evaluation and plan have been discussed with the prior provider who initially evaluated the patient.     EMERGENCY DEPARTMENT COURSE / MEDICAL DECISION MAKING:       MEDICATIONS GIVEN:  Orders Placed This Encounter   Medications    benzonatate (TESSALON) capsule 100 mg    sodium chloride 0.9 % bolus 1,000 mL    amoxicillin-clavulanate (AUGMENTIN) 875-125 MG per tablet     Sig: Take 1 tablet by mouth 2 times daily for 5 days     Dispense:  10 tablet     Refill:  0    amoxicillin-clavulanate (AUGMENTIN) 875-125 MG per tablet 1 tablet     Order Specific Question:   Antimicrobial Indications     Answer:   Other     Order Specific Question:   Other Abx Indication     Answer:   bronchitis       LABS / RADIOLOGY:     Labs Reviewed   CBC WITH AUTO DIFFERENTIAL - Abnormal; Notable for the following components:       Result Value    RBC 3.78 (*)     Hematocrit 39.4 (*)     .2 (*)     MCH 35.7 (*)     RDW 16.7 (*)     Neutrophils % 70 (*)     Lymphocytes % 18 (*)     All other components within normal limits   COMPREHENSIVE METABOLIC PANEL - Abnormal; Notable for the following components:    Sodium 133 (*)     Glucose 136 (*)     BUN 4 (*)     Creatinine 0.6 (*)     Calcium 8.0 (*)     Albumin 2.9 (*)     Total Bilirubin 4.8 (*)     AST 96 (*)     All other components within normal limits   TROPONIN   TSH WITH REFLEX   MONONUCLEOSIS SCREEN       XR CHEST (2 VW)    Result Date: 3/23/2024  EXAMINATION: TWO XRAY VIEWS OF THE CHEST 3/23/2024 6:57 pm COMPARISON: None. HISTORY: ORDERING SYSTEM PROVIDED HISTORY: Productive cough x5 weeks TECHNOLOGIST PROVIDED HISTORY: Productive cough x5 weeks FINDINGS: The heart is normal in size.  There are prominent perihilar markings.  There are mild right basal linear opacities, likely

## 2024-03-24 NOTE — DISCHARGE INSTRUCTIONS
You are seen in the emergency department for your chronic cough.  We have prescribed you antibiotics.  Please take as prescribed.  The Monospot test stool was pending.  [facility]      If you are given an antibiotic, then make sure you get the prescription filled and take the antibiotics until finished.  Drink plenty of water while taking the antibiotics.  Avoid drinking alcohol or drinks that have caffeine in it while taking antibiotics.       Being around people that smoke, darrin houses, weather change can cause an asthma exacerbation.  If you smoke, then you should discuss with your physician about ways to quit smoking.    PLEASE RETURN TO THE EMERGENCY DEPARTMENT IMMEDIATELY for worsening symptoms of shortness of breath, wheezing, change in the amount of sputum that you cough up or a change in the color of your sputum, using your inhaler more frequently or if your inhaler only lasts up to 2 hours, or if you develop any concerning symptoms such as: high fever not relieved by acetaminophen (Tylenol) and/or ibuprofen (Motrin / Advil), chills, shortness of breath, chest pain, feeling of your heart fluttering or racing, persistent nausea and/or vomiting, vomiting up blood, blood in your stool, loss of consciousness, numbness, weakness or tingling in the arms or legs or change in color of the extremities, changes in mental status, persistent headache, blurry vision, loss of bladder / bowel control, unable to follow up with your physician, or other any other care or concern.

## 2024-03-25 LAB
EKG ATRIAL RATE: 78 BPM
EKG P AXIS: 59 DEGREES
EKG P-R INTERVAL: 170 MS
EKG Q-T INTERVAL: 414 MS
EKG QRS DURATION: 92 MS
EKG QTC CALCULATION (BAZETT): 471 MS
EKG R AXIS: 62 DEGREES
EKG T AXIS: 47 DEGREES
EKG VENTRICULAR RATE: 78 BPM

## 2024-03-25 PROCEDURE — 93010 ELECTROCARDIOGRAM REPORT: CPT | Performed by: INTERNAL MEDICINE

## 2024-08-03 ENCOUNTER — HOSPITAL ENCOUNTER (OUTPATIENT)
Age: 44
Discharge: HOME | End: 2024-08-03
Payer: COMMERCIAL

## 2024-08-03 LAB
25(OH)D3 SERPL-MCNC: 20.8 NG/ML (ref 30–100)
ALBUMIN SERPL-MCNC: 2.8 G/DL (ref 3.5–5.2)
ALP SERPL-CCNC: 134 U/L (ref 40–129)
ALT SERPL-CCNC: 41 U/L (ref 5–41)
ANION GAP SERPL CALCULATED.3IONS-SCNC: 10 MMOL/L (ref 9–17)
AST SERPL-CCNC: 122 U/L
BASOPHILS # BLD: 0.05 K/UL (ref 0–0.2)
BASOPHILS NFR BLD: 1 % (ref 0–2)
BILIRUB SERPL-MCNC: 3.9 MG/DL (ref 0.3–1.2)
BILIRUB UR QL STRIP: NEGATIVE
BUN SERPL-MCNC: 32 MG/DL (ref 6–20)
CALCIUM SERPL-MCNC: 8.5 MG/DL (ref 8.6–10.4)
CHLORIDE SERPL-SCNC: 93 MMOL/L (ref 98–107)
CHOLEST SERPL-MCNC: 253 MG/DL
CHOLESTEROL/HDL RATIO: 6.2
CK SERPL-CCNC: 168 U/L (ref 39–308)
CLARITY UR: CLEAR
CO2 SERPL-SCNC: 26 MMOL/L (ref 20–31)
COLOR UR: YELLOW
COMMENT: ABNORMAL
CREAT SERPL-MCNC: 1.9 MG/DL (ref 0.7–1.2)
EOSINOPHIL # BLD: 0.15 K/UL (ref 0–0.4)
EOSINOPHILS RELATIVE PERCENT: 3 % (ref 0–4)
ERYTHROCYTE [DISTWIDTH] IN BLOOD BY AUTOMATED COUNT: 14.7 % (ref 11.5–14.9)
GFR, ESTIMATED: 44 ML/MIN/1.73M2
GLUCOSE SERPL-MCNC: 115 MG/DL (ref 70–99)
GLUCOSE UR STRIP-MCNC: ABNORMAL MG/DL
HCT VFR BLD AUTO: 29.7 % (ref 41–53)
HDLC SERPL-MCNC: 41 MG/DL
HGB BLD-MCNC: 10.1 G/DL (ref 13.5–17.5)
HGB UR QL STRIP.AUTO: NEGATIVE
KETONES UR STRIP-MCNC: NEGATIVE MG/DL
LDLC SERPL CALC-MCNC: 173 MG/DL (ref 0–130)
LEUKOCYTE ESTERASE UR QL STRIP: NEGATIVE
LYMPHOCYTES NFR BLD: 1.12 K/UL (ref 1–4.8)
LYMPHOCYTES RELATIVE PERCENT: 22 % (ref 24–44)
MCH RBC QN AUTO: 38.8 PG (ref 26–34)
MCHC RBC AUTO-ENTMCNC: 34.2 G/DL (ref 31–37)
MCV RBC AUTO: 113.6 FL (ref 80–100)
MONOCYTES NFR BLD: 0.56 K/UL (ref 0.1–1.3)
MONOCYTES NFR BLD: 11 % (ref 1–7)
MORPHOLOGY: ABNORMAL
MORPHOLOGY: ABNORMAL
NEUTROPHILS NFR BLD: 63 % (ref 36–66)
NEUTS SEG NFR BLD: 3.22 K/UL (ref 1.3–9.1)
NITRITE UR QL STRIP: NEGATIVE
PH UR STRIP: 5.5 [PH] (ref 5–8)
PLATELET # BLD AUTO: 77 K/UL (ref 150–450)
PMV BLD AUTO: 9.1 FL (ref 6–12)
POTASSIUM SERPL-SCNC: 5.2 MMOL/L (ref 3.7–5.3)
PROT SERPL-MCNC: 8.5 G/DL (ref 6.4–8.3)
PROT UR STRIP-MCNC: NEGATIVE MG/DL
PSA SERPL-MCNC: 0.3 NG/ML (ref 0–4)
RBC # BLD AUTO: 2.61 M/UL (ref 4.5–5.9)
RETICS # AUTO: 0.04 M/UL (ref 0.02–0.1)
RETICS/RBC NFR AUTO: 1.4 % (ref 0.5–2)
SODIUM SERPL-SCNC: 129 MMOL/L (ref 135–144)
SP GR UR STRIP: 1.01 (ref 1–1.03)
TRIGL SERPL-MCNC: 194 MG/DL
TSH SERPL DL<=0.05 MIU/L-ACNC: 0.93 UIU/ML (ref 0.3–5)
UROBILINOGEN UR STRIP-ACNC: NORMAL EU/DL (ref 0–1)
VIT B12 SERPL-MCNC: 999 PG/ML (ref 232–1245)
WBC OTHER # BLD: 5.1 K/UL (ref 3.5–11)

## 2024-08-03 PROCEDURE — 80061 LIPID PANEL: CPT

## 2024-08-03 PROCEDURE — 81003 URINALYSIS AUTO W/O SCOPE: CPT

## 2024-08-03 PROCEDURE — 82607 VITAMIN B-12: CPT

## 2024-08-03 PROCEDURE — 85025 COMPLETE CBC W/AUTO DIFF WBC: CPT

## 2024-08-03 PROCEDURE — 82306 VITAMIN D 25 HYDROXY: CPT

## 2024-08-03 PROCEDURE — 36415 COLL VENOUS BLD VENIPUNCTURE: CPT

## 2024-08-03 PROCEDURE — 84443 ASSAY THYROID STIM HORMONE: CPT

## 2024-08-03 PROCEDURE — 82550 ASSAY OF CK (CPK): CPT

## 2024-08-03 PROCEDURE — 80053 COMPREHEN METABOLIC PANEL: CPT

## 2024-08-03 PROCEDURE — G0103 PSA SCREENING: HCPCS

## 2024-08-03 PROCEDURE — 85045 AUTOMATED RETICULOCYTE COUNT: CPT

## 2024-08-05 LAB
EST. AVERAGE GLUCOSE BLD GHB EST-MCNC: 94 MG/DL
HBA1C MFR BLD: 4.9 % (ref 4–6)
PATH REV BLD -IMP: NORMAL
SURGICAL PATHOLOGY REPORT: NORMAL

## (undated) DEVICE — PAD,ABDOMINAL,5"X9",ST,LF,25/BX: Brand: MEDLINE INDUSTRIES, INC.

## (undated) DEVICE — SUTURE VCRL SZ 3-0 L27IN ABSRB UD L26MM SH 1/2 CIR J416H

## (undated) DEVICE — APPLICATOR MEDICATED 26 CC SOLUTION HI LT ORNG CHLORAPREP

## (undated) DEVICE — TUBING, SUCTION, 1/4" X 12', STRAIGHT: Brand: MEDLINE

## (undated) DEVICE — HYPODERMIC SAFETY NEEDLE: Brand: MAGELLAN

## (undated) DEVICE — HERNIA PK

## (undated) DEVICE — SINGLE-USE BIOPSY FORCEPS: Brand: RADIAL JAW 4

## (undated) DEVICE — SEALER ENDOSCP NANO COAT OPN DIV CRV L JAW LIGASURE IMPACT

## (undated) DEVICE — SWABSTICK MEDICATED 10% POVIDONE IOD PVP SGL ANTISEP SAT

## (undated) DEVICE — ADAPTER TBNG LUER STUB 15 GA INTMED

## (undated) DEVICE — SUTURE VCRL SZ 2-0 L27IN ABSRB UD L26MM CT-2 1/2 CIR J269H

## (undated) DEVICE — SUTURE PROL SZ 2-0 L30IN NONABSORBABLE BLU L26MM SH 1/2 CIR 8833H

## (undated) DEVICE — GARMENT,MEDLINE,DVT,INT,CALF,MED, GEN2: Brand: MEDLINE

## (undated) DEVICE — INTENDED FOR TISSUE SEPARATION, AND OTHER PROCEDURES THAT REQUIRE A SHARP SURGICAL BLADE TO PUNCTURE OR CUT.: Brand: BARD-PARKER ® CARBON RIB-BACK BLADES

## (undated) DEVICE — BLANKET WRM W29.9XL79.1IN UP BODY FORC AIR MISTRAL-AIR

## (undated) DEVICE — YANKAUER,FLEXIBLE HANDLE,REGLR CAPACITY: Brand: MEDLINE INDUSTRIES, INC.

## (undated) DEVICE — GAUZE,SPONGE,FLUFF,6"X6.75",STRL,5/TRAY: Brand: MEDLINE

## (undated) DEVICE — SUTURE PROL SZ 0 L30IN NONABSORBABLE BLU L36MM CT-1 1/2 CIR 8424H

## (undated) DEVICE — ADHESIVE SKIN CLSR 0.7ML TOP DERMBND ADV

## (undated) DEVICE — SYRINGE MED 50ML LUERLOCK TIP

## (undated) DEVICE — JELLY,LUBE,STERILE,FLIP TOP,TUBE,2-OZ: Brand: MEDLINE

## (undated) DEVICE — GLOVE SURG SZ 75 CRM LTX FREE POLYISOPRENE POLYMER BEAD ANTI

## (undated) DEVICE — CO2 CANNULA,SUPERSOFT, ADLT,7'O2,7'CO2: Brand: MEDLINE

## (undated) DEVICE — Device: Brand: DEFENDO VALVE AND CONNECTOR KIT

## (undated) DEVICE — SUTURE PDS II SZ 2-0 L27IN ABSRB VLT L26MM CT-2 1/2 CIR Z333H

## (undated) DEVICE — SUTURE PROL SZ 2-0 L30IN NONABSORBABLE BLU L26MM CT-2 1/2 8411H

## (undated) DEVICE — GOWN,AURORA,NONREINFORCED,LARGE: Brand: MEDLINE

## (undated) DEVICE — SUTURE PROL SZ 0 L30IN NONABSORBABLE BLU CTX L48MM 1/2 CIR 8454H

## (undated) DEVICE — BINDER ABD UNISX 3 PNL E PREM CNTCT CLSR L XL 46INX62INX9IN

## (undated) DEVICE — GAUZE,SPONGE,4"X4",16PLY,STRL,LF,10/TRAY: Brand: MEDLINE

## (undated) DEVICE — BASIN EMSIS 700ML GRAPHITE PLAS KID SHP GRAD